# Patient Record
Sex: FEMALE | Race: BLACK OR AFRICAN AMERICAN | NOT HISPANIC OR LATINO | Employment: OTHER | URBAN - METROPOLITAN AREA
[De-identification: names, ages, dates, MRNs, and addresses within clinical notes are randomized per-mention and may not be internally consistent; named-entity substitution may affect disease eponyms.]

---

## 2018-03-26 ENCOUNTER — TELEPHONE (OUTPATIENT)
Dept: OBSTETRICS AND GYNECOLOGY | Facility: CLINIC | Age: 78
End: 2018-03-26

## 2018-08-13 PROBLEM — R10.9 ABDOMINAL PAIN: Status: ACTIVE | Noted: 2018-08-13

## 2018-08-30 DIAGNOSIS — M79.642 LEFT HAND PAIN: Primary | ICD-10-CM

## 2022-08-27 PROBLEM — I10 BENIGN ESSENTIAL HTN: Status: ACTIVE | Noted: 2022-08-27

## 2022-08-27 PROBLEM — N17.9 ACUTE-ON-CHRONIC KIDNEY INJURY: Status: ACTIVE | Noted: 2022-08-27

## 2022-08-27 PROBLEM — N18.9 ACUTE-ON-CHRONIC KIDNEY INJURY: Status: ACTIVE | Noted: 2022-08-27

## 2022-08-27 PROBLEM — E83.52 HYPERCALCEMIA: Status: ACTIVE | Noted: 2022-08-27

## 2022-08-29 PROBLEM — R00.1 SINUS BRADYCARDIA: Status: ACTIVE | Noted: 2022-08-29

## 2022-08-29 PROBLEM — R07.9 CHEST PAIN: Status: ACTIVE | Noted: 2022-08-29

## 2022-08-29 PROBLEM — D64.9 ACUTE ON CHRONIC ANEMIA: Status: ACTIVE | Noted: 2022-08-29

## 2022-08-30 PROBLEM — I35.0 NONRHEUMATIC AORTIC VALVE STENOSIS: Status: ACTIVE | Noted: 2022-08-30

## 2022-08-30 PROBLEM — I51.89 DIASTOLIC DYSFUNCTION: Status: ACTIVE | Noted: 2022-08-30

## 2022-09-02 PROBLEM — R10.9 ABDOMINAL PAIN: Status: RESOLVED | Noted: 2018-08-13 | Resolved: 2022-09-02

## 2022-09-02 PROBLEM — R07.9 CHEST PAIN: Status: RESOLVED | Noted: 2022-08-29 | Resolved: 2022-09-02

## 2022-09-02 PROBLEM — E83.52 HYPERCALCEMIA: Status: RESOLVED | Noted: 2022-08-27 | Resolved: 2022-09-02

## 2022-09-02 PROBLEM — D64.9 ACUTE ON CHRONIC ANEMIA: Status: RESOLVED | Noted: 2022-08-29 | Resolved: 2022-09-02

## 2022-09-02 PROBLEM — D50.9 IDA (IRON DEFICIENCY ANEMIA): Status: ACTIVE | Noted: 2022-09-02

## 2022-09-06 ENCOUNTER — PATIENT OUTREACH (OUTPATIENT)
Dept: ADMINISTRATIVE | Facility: CLINIC | Age: 82
End: 2022-09-06
Payer: MEDICARE

## 2022-09-06 NOTE — PROGRESS NOTES
C3 nurse spoke with Elina Jerez  for a TCC post hospital discharge follow up call. The patient has a scheduled HOSFU appointment with Dr Brown ( kidney/ PCP ) on 09/08/2022 @ 1100.  Called back to offer NP , pt daughter confirmed that Dr Brown; Kidney MD is PCP and appt is scheduled as above.

## 2022-09-24 ENCOUNTER — HOSPITAL ENCOUNTER (INPATIENT)
Facility: HOSPITAL | Age: 82
LOS: 3 days | Discharge: HOME | DRG: 644 | End: 2022-09-27
Attending: EMERGENCY MEDICINE | Admitting: HOSPITALIST
Payer: MEDICARE

## 2022-09-24 DIAGNOSIS — R11.10 VOMITING: ICD-10-CM

## 2022-09-24 DIAGNOSIS — E87.6 HYPOKALEMIA: ICD-10-CM

## 2022-09-24 DIAGNOSIS — R07.9 CHEST PAIN: ICD-10-CM

## 2022-09-24 DIAGNOSIS — E16.2 HYPOGLYCEMIA: ICD-10-CM

## 2022-09-24 DIAGNOSIS — R10.9 ABDOMINAL PAIN, UNSPECIFIED ABDOMINAL LOCATION: Primary | ICD-10-CM

## 2022-09-24 DIAGNOSIS — E83.42 HYPOMAGNESEMIA: ICD-10-CM

## 2022-09-24 DIAGNOSIS — R94.31 QT PROLONGATION: ICD-10-CM

## 2022-09-24 DIAGNOSIS — R53.1 WEAKNESS: ICD-10-CM

## 2022-09-24 DIAGNOSIS — E83.52 HYPERCALCEMIA: ICD-10-CM

## 2022-09-24 PROBLEM — E87.8 ELECTROLYTE ABNORMALITY: Status: ACTIVE | Noted: 2022-09-24

## 2022-09-24 PROBLEM — K59.00 CONSTIPATION: Status: ACTIVE | Noted: 2022-09-24

## 2022-09-24 PROBLEM — R11.2 INTRACTABLE NAUSEA AND VOMITING: Status: ACTIVE | Noted: 2022-09-24

## 2022-09-24 PROBLEM — E87.1 HYPONATREMIA: Status: ACTIVE | Noted: 2022-09-24

## 2022-09-24 LAB
ALBUMIN SERPL BCP-MCNC: 3.7 G/DL (ref 3.5–5.2)
ALP SERPL-CCNC: 52 U/L (ref 55–135)
ALT SERPL W/O P-5'-P-CCNC: 9 U/L (ref 10–44)
ANION GAP SERPL CALC-SCNC: 15 MMOL/L (ref 8–16)
AST SERPL-CCNC: 35 U/L (ref 10–40)
BASOPHILS # BLD AUTO: 0.05 K/UL (ref 0–0.2)
BASOPHILS NFR BLD: 0.9 % (ref 0–1.9)
BILIRUB SERPL-MCNC: 0.7 MG/DL (ref 0.1–1)
BNP SERPL-MCNC: 209 PG/ML (ref 0–99)
BUN SERPL-MCNC: 41 MG/DL (ref 8–23)
CALCIUM SERPL-MCNC: 12.3 MG/DL (ref 8.7–10.5)
CHLORIDE SERPL-SCNC: 100 MMOL/L (ref 95–110)
CK SERPL-CCNC: 168 U/L (ref 20–180)
CO2 SERPL-SCNC: 16 MMOL/L (ref 23–29)
CREAT SERPL-MCNC: 4 MG/DL (ref 0.5–1.4)
DIFFERENTIAL METHOD: ABNORMAL
EOSINOPHIL # BLD AUTO: 0.1 K/UL (ref 0–0.5)
EOSINOPHIL NFR BLD: 1.8 % (ref 0–8)
ERYTHROCYTE [DISTWIDTH] IN BLOOD BY AUTOMATED COUNT: 15.5 % (ref 11.5–14.5)
EST. GFR  (NO RACE VARIABLE): 10.7 ML/MIN/1.73 M^2
GLUCOSE SERPL-MCNC: 63 MG/DL (ref 70–110)
HCT VFR BLD AUTO: 25.9 % (ref 37–48.5)
HGB BLD-MCNC: 8.7 G/DL (ref 12–16)
IMM GRANULOCYTES # BLD AUTO: 0.02 K/UL (ref 0–0.04)
IMM GRANULOCYTES NFR BLD AUTO: 0.4 % (ref 0–0.5)
LIPASE SERPL-CCNC: 58 U/L (ref 4–60)
LYMPHOCYTES # BLD AUTO: 0.8 K/UL (ref 1–4.8)
LYMPHOCYTES NFR BLD: 13.4 % (ref 18–48)
MAGNESIUM SERPL-MCNC: 1.2 MG/DL (ref 1.6–2.6)
MCH RBC QN AUTO: 30.6 PG (ref 27–31)
MCHC RBC AUTO-ENTMCNC: 33.6 G/DL (ref 32–36)
MCV RBC AUTO: 91 FL (ref 82–98)
MONOCYTES # BLD AUTO: 0.5 K/UL (ref 0.3–1)
MONOCYTES NFR BLD: 8.6 % (ref 4–15)
NEUTROPHILS # BLD AUTO: 4.2 K/UL (ref 1.8–7.7)
NEUTROPHILS NFR BLD: 74.9 % (ref 38–73)
NRBC BLD-RTO: 0 /100 WBC
PHOSPHATE SERPL-MCNC: 2.3 MG/DL (ref 2.7–4.5)
PLATELET # BLD AUTO: 176 K/UL (ref 150–450)
PMV BLD AUTO: 10.6 FL (ref 9.2–12.9)
POCT GLUCOSE: 74 MG/DL (ref 70–110)
POCT GLUCOSE: 80 MG/DL (ref 70–110)
POTASSIUM SERPL-SCNC: 3.2 MMOL/L (ref 3.5–5.1)
PROT SERPL-MCNC: 6.4 G/DL (ref 6–8.4)
RBC # BLD AUTO: 2.84 M/UL (ref 4–5.4)
SODIUM SERPL-SCNC: 131 MMOL/L (ref 136–145)
TROPONIN I SERPL DL<=0.01 NG/ML-MCNC: 0.73 NG/ML (ref 0–0.03)
TROPONIN I SERPL DL<=0.01 NG/ML-MCNC: 0.84 NG/ML (ref 0–0.03)
TSH SERPL DL<=0.005 MIU/L-ACNC: 0.41 UIU/ML (ref 0.4–4)
WBC # BLD AUTO: 5.59 K/UL (ref 3.9–12.7)

## 2022-09-24 PROCEDURE — 93005 ELECTROCARDIOGRAM TRACING: CPT

## 2022-09-24 PROCEDURE — 80053 COMPREHEN METABOLIC PANEL: CPT | Performed by: EMERGENCY MEDICINE

## 2022-09-24 PROCEDURE — 93010 EKG 12-LEAD: ICD-10-PCS | Mod: ,,, | Performed by: INTERNAL MEDICINE

## 2022-09-24 PROCEDURE — 82550 ASSAY OF CK (CPK): CPT | Performed by: EMERGENCY MEDICINE

## 2022-09-24 PROCEDURE — 84484 ASSAY OF TROPONIN QUANT: CPT | Performed by: EMERGENCY MEDICINE

## 2022-09-24 PROCEDURE — 85025 COMPLETE CBC W/AUTO DIFF WBC: CPT | Performed by: EMERGENCY MEDICINE

## 2022-09-24 PROCEDURE — 93010 ELECTROCARDIOGRAM REPORT: CPT | Mod: ,,, | Performed by: INTERNAL MEDICINE

## 2022-09-24 PROCEDURE — 99285 EMERGENCY DEPT VISIT HI MDM: CPT | Mod: ,,, | Performed by: EMERGENCY MEDICINE

## 2022-09-24 PROCEDURE — 99285 PR EMERGENCY DEPT VISIT,LEVEL V: ICD-10-PCS | Mod: ,,, | Performed by: EMERGENCY MEDICINE

## 2022-09-24 PROCEDURE — 84443 ASSAY THYROID STIM HORMONE: CPT | Performed by: EMERGENCY MEDICINE

## 2022-09-24 PROCEDURE — 99223 PR INITIAL HOSPITAL CARE,LEVL III: ICD-10-PCS | Mod: GC,,, | Performed by: HOSPITALIST

## 2022-09-24 PROCEDURE — 63600175 PHARM REV CODE 636 W HCPCS

## 2022-09-24 PROCEDURE — 84484 ASSAY OF TROPONIN QUANT: CPT | Mod: 91

## 2022-09-24 PROCEDURE — 25000003 PHARM REV CODE 250: Performed by: EMERGENCY MEDICINE

## 2022-09-24 PROCEDURE — 83735 ASSAY OF MAGNESIUM: CPT | Performed by: EMERGENCY MEDICINE

## 2022-09-24 PROCEDURE — 25000003 PHARM REV CODE 250

## 2022-09-24 PROCEDURE — 83880 ASSAY OF NATRIURETIC PEPTIDE: CPT | Performed by: EMERGENCY MEDICINE

## 2022-09-24 PROCEDURE — 20600001 HC STEP DOWN PRIVATE ROOM

## 2022-09-24 PROCEDURE — 99223 1ST HOSP IP/OBS HIGH 75: CPT | Mod: GC,,, | Performed by: HOSPITALIST

## 2022-09-24 PROCEDURE — 84100 ASSAY OF PHOSPHORUS: CPT

## 2022-09-24 PROCEDURE — 99285 EMERGENCY DEPT VISIT HI MDM: CPT | Mod: 25

## 2022-09-24 PROCEDURE — 83690 ASSAY OF LIPASE: CPT | Performed by: EMERGENCY MEDICINE

## 2022-09-24 RX ORDER — HYDROXYCHLOROQUINE SULFATE 200 MG/1
200 TABLET, FILM COATED ORAL DAILY
Status: DISCONTINUED | OUTPATIENT
Start: 2022-09-25 | End: 2022-09-27 | Stop reason: HOSPADM

## 2022-09-24 RX ORDER — ATORVASTATIN CALCIUM 20 MG/1
20 TABLET, FILM COATED ORAL NIGHTLY
Status: COMPLETED | OUTPATIENT
Start: 2022-09-25 | End: 2022-09-25

## 2022-09-24 RX ORDER — ACETAMINOPHEN 325 MG/1
650 TABLET ORAL EVERY 6 HOURS PRN
Status: DISCONTINUED | OUTPATIENT
Start: 2022-09-25 | End: 2022-09-27 | Stop reason: HOSPADM

## 2022-09-24 RX ORDER — PROCHLORPERAZINE EDISYLATE 5 MG/ML
5 INJECTION INTRAMUSCULAR; INTRAVENOUS EVERY 6 HOURS PRN
Status: DISCONTINUED | OUTPATIENT
Start: 2022-09-24 | End: 2022-09-27 | Stop reason: HOSPADM

## 2022-09-24 RX ORDER — LANOLIN ALCOHOL/MO/W.PET/CERES
1 CREAM (GRAM) TOPICAL
Status: DISCONTINUED | OUTPATIENT
Start: 2022-09-25 | End: 2022-09-27 | Stop reason: HOSPADM

## 2022-09-24 RX ORDER — PROMETHAZINE HYDROCHLORIDE 25 MG/1
25 SUPPOSITORY RECTAL EVERY 6 HOURS PRN
Status: DISCONTINUED | OUTPATIENT
Start: 2022-09-24 | End: 2022-09-27 | Stop reason: HOSPADM

## 2022-09-24 RX ORDER — NALOXONE HCL 0.4 MG/ML
0.02 VIAL (ML) INJECTION
Status: DISCONTINUED | OUTPATIENT
Start: 2022-09-24 | End: 2022-09-27 | Stop reason: HOSPADM

## 2022-09-24 RX ORDER — SODIUM CHLORIDE 0.9 % (FLUSH) 0.9 %
10 SYRINGE (ML) INJECTION EVERY 12 HOURS PRN
Status: DISCONTINUED | OUTPATIENT
Start: 2022-09-24 | End: 2022-09-27 | Stop reason: HOSPADM

## 2022-09-24 RX ORDER — IBUPROFEN 200 MG
16 TABLET ORAL
Status: DISCONTINUED | OUTPATIENT
Start: 2022-09-24 | End: 2022-09-27 | Stop reason: HOSPADM

## 2022-09-24 RX ORDER — ALLOPURINOL 300 MG/1
300 TABLET ORAL DAILY
Status: DISCONTINUED | OUTPATIENT
Start: 2022-09-25 | End: 2022-09-25

## 2022-09-24 RX ORDER — MAGNESIUM SULFATE HEPTAHYDRATE 40 MG/ML
2 INJECTION, SOLUTION INTRAVENOUS
Status: DISCONTINUED | OUTPATIENT
Start: 2022-09-24 | End: 2022-09-24

## 2022-09-24 RX ORDER — POLYETHYLENE GLYCOL 3350 17 G/17G
17 POWDER, FOR SOLUTION ORAL DAILY
Status: DISCONTINUED | OUTPATIENT
Start: 2022-09-25 | End: 2022-09-27 | Stop reason: HOSPADM

## 2022-09-24 RX ORDER — SODIUM CHLORIDE 9 MG/ML
INJECTION, SOLUTION INTRAVENOUS CONTINUOUS
Status: DISCONTINUED | OUTPATIENT
Start: 2022-09-24 | End: 2022-09-25

## 2022-09-24 RX ORDER — ACETAMINOPHEN AND CODEINE PHOSPHATE 300; 30 MG/1; MG/1
1 TABLET ORAL EVERY 8 HOURS PRN
COMMUNITY

## 2022-09-24 RX ORDER — LEVOTHYROXINE SODIUM 50 UG/1
50 TABLET ORAL
Status: DISCONTINUED | OUTPATIENT
Start: 2022-09-25 | End: 2022-09-27 | Stop reason: HOSPADM

## 2022-09-24 RX ORDER — IBUPROFEN 200 MG
24 TABLET ORAL
Status: DISCONTINUED | OUTPATIENT
Start: 2022-09-24 | End: 2022-09-27 | Stop reason: HOSPADM

## 2022-09-24 RX ORDER — POTASSIUM CHLORIDE 7.45 MG/ML
10 INJECTION INTRAVENOUS
Status: COMPLETED | OUTPATIENT
Start: 2022-09-24 | End: 2022-09-25

## 2022-09-24 RX ORDER — POTASSIUM CHLORIDE 20 MEQ/1
40 TABLET, EXTENDED RELEASE ORAL
Status: DISCONTINUED | OUTPATIENT
Start: 2022-09-24 | End: 2022-09-24

## 2022-09-24 RX ORDER — AMOXICILLIN 250 MG
1 CAPSULE ORAL DAILY
Status: DISCONTINUED | OUTPATIENT
Start: 2022-09-25 | End: 2022-09-27 | Stop reason: HOSPADM

## 2022-09-24 RX ORDER — ALISKIREN 150 MG/1
300 TABLET, FILM COATED ORAL DAILY
Status: DISCONTINUED | OUTPATIENT
Start: 2022-09-25 | End: 2022-09-27 | Stop reason: HOSPADM

## 2022-09-24 RX ORDER — ALLOPURINOL 300 MG/1
300 TABLET ORAL DAILY
Status: COMPLETED | OUTPATIENT
Start: 2022-09-25 | End: 2022-09-25

## 2022-09-24 RX ORDER — GLUCAGON 1 MG
1 KIT INJECTION
Status: DISCONTINUED | OUTPATIENT
Start: 2022-09-24 | End: 2022-09-27 | Stop reason: HOSPADM

## 2022-09-24 RX ORDER — MAGNESIUM SULFATE HEPTAHYDRATE 40 MG/ML
2 INJECTION, SOLUTION INTRAVENOUS
Status: COMPLETED | OUTPATIENT
Start: 2022-09-24 | End: 2022-09-25

## 2022-09-24 RX ORDER — HYDROCODONE BITARTRATE AND ACETAMINOPHEN 5; 325 MG/1; MG/1
1 TABLET ORAL EVERY 6 HOURS PRN
Status: DISCONTINUED | OUTPATIENT
Start: 2022-09-25 | End: 2022-09-27 | Stop reason: HOSPADM

## 2022-09-24 RX ORDER — ATORVASTATIN CALCIUM 20 MG/1
20 TABLET, FILM COATED ORAL DAILY
Status: DISCONTINUED | OUTPATIENT
Start: 2022-09-25 | End: 2022-09-25

## 2022-09-24 RX ORDER — FAMOTIDINE 20 MG/1
20 TABLET, FILM COATED ORAL DAILY
Status: DISCONTINUED | OUTPATIENT
Start: 2022-09-25 | End: 2022-09-27 | Stop reason: HOSPADM

## 2022-09-24 RX ADMIN — POTASSIUM CHLORIDE 10 MEQ: 7.46 INJECTION, SOLUTION INTRAVENOUS at 08:09

## 2022-09-24 RX ADMIN — FOLIC ACID 1 MG: 5 INJECTION, SOLUTION INTRAMUSCULAR; INTRAVENOUS; SUBCUTANEOUS at 08:09

## 2022-09-24 RX ADMIN — POTASSIUM CHLORIDE 10 MEQ: 7.46 INJECTION, SOLUTION INTRAVENOUS at 09:09

## 2022-09-24 RX ADMIN — SODIUM CHLORIDE 1000 ML: 0.9 INJECTION, SOLUTION INTRAVENOUS at 07:09

## 2022-09-24 RX ADMIN — SODIUM CHLORIDE: 0.9 INJECTION, SOLUTION INTRAVENOUS at 11:09

## 2022-09-24 RX ADMIN — MAGNESIUM SULFATE 2 G: 2 INJECTION INTRAVENOUS at 10:09

## 2022-09-24 RX ADMIN — POTASSIUM CHLORIDE 10 MEQ: 7.46 INJECTION, SOLUTION INTRAVENOUS at 10:09

## 2022-09-24 RX ADMIN — THIAMINE HYDROCHLORIDE 100 MG: 100 INJECTION, SOLUTION INTRAMUSCULAR; INTRAVENOUS at 08:09

## 2022-09-24 RX ADMIN — PROCHLORPERAZINE EDISYLATE 5 MG: 5 INJECTION INTRAMUSCULAR; INTRAVENOUS at 09:09

## 2022-09-24 NOTE — SUBJECTIVE & OBJECTIVE
Past Medical History:   Diagnosis Date    High cholesterol     Hypertension     Sleep apnea     Thyroid disease        Past Surgical History:   Procedure Laterality Date    ESOPHAGOGASTRODUODENOSCOPY  08/13/2018    ESOPHAGOGASTRODUODENOSCOPY N/A 8/13/2018    Procedure: EGD (ESOPHAGOGASTRODUODENOSCOPY);  Surgeon: Han Garcia MD;  Location: [facility];  Service: Endoscopy;  Laterality: N/A;    HYSTERECTOMY         Review of patient's allergies indicates:   Allergen Reactions    Banana     Pork/porcine containing products Diarrhea    Shellfish containing products Diarrhea       Current Facility-Administered Medications on File Prior to Encounter   Medication    lactated ringers infusion     Current Outpatient Medications on File Prior to Encounter   Medication Sig    aliskiren (TEKTURNA) 300 MG Tab Take by mouth once daily.    allopurinoL (ZYLOPRIM) 300 MG tablet Take 300 mg by mouth once daily.    amLODIPine (NORVASC) 10 MG tablet Take 10 mg by mouth once daily.    atorvastatin (LIPITOR) 20 MG tablet Take 20 mg by mouth once daily.    cloNIDine (CATAPRES) 0.2 MG tablet Take 0.2 mg by mouth 2 (two) times daily.    famotidine (PEPCID) 20 MG tablet Take 1 tablet (20 mg total) by mouth Daily.    ferrous sulfate 325 (65 FE) MG EC tablet Take 325 mg by mouth 3 (three) times daily with meals.    hydroCHLOROthiazide (HYDRODIURIL) 25 MG tablet Take 25 mg by mouth once daily.    HYDROcodone-acetaminophen (NORCO) 5-325 mg per tablet Take 1 tablet by mouth every 6 (six) hours as needed for Pain. (Patient not taking: Reported on 9/6/2022)    hydroxychloroquine (PLAQUENIL) 200 mg tablet Take by mouth once daily.    levothyroxine (SYNTHROID) 50 MCG tablet Take 50 mcg by mouth once daily.    montelukast (SINGULAIR) 10 mg tablet Take 10 mg by mouth every evening.    nebivoloL (BYSTOLIC) 10 MG Tab Take 10 mg by mouth every evening.    ondansetron (ZOFRAN-ODT) 4 MG TbDL Take 1 tablet (4 mg total) by mouth every 6 (six) hours as  needed.    prochlorperazine (COMPAZINE) 10 MG tablet Take 1 tablet (10 mg total) by mouth every 6 (six) hours as needed.     Family History    None       Tobacco Use    Smoking status: Never    Smokeless tobacco: Never   Substance and Sexual Activity    Alcohol use: No    Drug use: No    Sexual activity: Never     Review of Systems   Constitutional:  Positive for fatigue and unexpected weight change (16 lb weight loss on 2-3 weeks). Negative for chills and fever.   HENT:  Positive for rhinorrhea. Negative for congestion, sore throat and trouble swallowing.    Eyes:  Negative for pain and visual disturbance.   Respiratory:  Positive for cough (dry). Negative for shortness of breath.    Cardiovascular:  Negative for chest pain.   Gastrointestinal:  Positive for abdominal pain (intermittent, relieved with bowel movements), constipation, nausea and vomiting. Negative for blood in stool and diarrhea.   Endocrine: Positive for cold intolerance. Negative for polyuria.   Genitourinary:  Negative for difficulty urinating and dysuria.   Musculoskeletal:  Positive for arthralgias (hip pain). Negative for myalgias.   Skin:  Negative for rash and wound.   Allergic/Immunologic: Positive for environmental allergies and food allergies.   Neurological:  Positive for weakness. Negative for dizziness and light-headedness.   Psychiatric/Behavioral:  Negative for confusion and decreased concentration.    Objective:     Vital Signs (Most Recent):  Temp: 97.8 °F (36.6 °C) (09/24/22 1337)  Pulse: 88 (09/24/22 1337)  Resp: 18 (09/24/22 1337)  BP: (!) 110/52 (09/24/22 1337)  SpO2: 96 % (09/24/22 1337)   Vital Signs (24h Range):  Temp:  [97.8 °F (36.6 °C)] 97.8 °F (36.6 °C)  Pulse:  [88] 88  Resp:  [18] 18  SpO2:  [96 %] 96 %  BP: (110)/(52) 110/52     Weight: 58.5 kg (129 lb)  Body mass index is 21.47 kg/m².    Physical Exam  Vitals and nursing note reviewed.   Constitutional:       General: She is not in acute distress.     Appearance:  Normal appearance. She is not ill-appearing or diaphoretic.   HENT:      Right Ear: External ear normal.      Left Ear: External ear normal.      Nose: Nose normal.      Mouth/Throat:      Mouth: Mucous membranes are moist.      Pharynx: Oropharynx is clear. No oropharyngeal exudate.   Eyes:      General: No scleral icterus.        Right eye: No discharge.         Left eye: No discharge.      Extraocular Movements: Extraocular movements intact.      Conjunctiva/sclera: Conjunctivae normal.   Cardiovascular:      Rate and Rhythm: Normal rate and regular rhythm.      Heart sounds: Normal heart sounds. No murmur heard.  Pulmonary:      Effort: Pulmonary effort is normal. No respiratory distress.      Breath sounds: Normal breath sounds. No wheezing or rales.   Abdominal:      General: Abdomen is flat. Bowel sounds are normal. There is no distension.      Palpations: There is no mass.      Tenderness: There is no abdominal tenderness.   Musculoskeletal:         General: No swelling or deformity. Normal range of motion.      Cervical back: Normal range of motion and neck supple.      Right lower leg: No edema.      Left lower leg: No edema.   Skin:     General: Skin is warm.      Capillary Refill: Capillary refill takes 2 to 3 seconds.      Coloration: Skin is not jaundiced.      Findings: No bruising.   Neurological:      Mental Status: She is alert and oriented to person, place, and time.   Psychiatric:         Mood and Affect: Mood normal.         Behavior: Behavior normal.         Thought Content: Thought content normal.           Significant Labs: All pertinent labs within the past 24 hours have been reviewed.    Significant Imaging: I have reviewed all pertinent imaging results/findings within the past 24 hours.

## 2022-09-24 NOTE — ED PROVIDER NOTES
Encounter Date: 9/24/2022       History     Chief Complaint   Patient presents with    Vomiting     N/v off and on for a month; has been admitted for same problem.  Unable to keep anything down     82-YEAR-OLD FEMALE WITH PAST MEDICAL HISTORY of hypertension, thyroid disease presents for evaluation of nausea and vomiting.  Reports symptoms have been intermittent for the last month.  She was recently admitted to the hospital for similar symptoms.  She states that she has not been able to eat and drink normally.  She reports that she is taking nausea medication without relief. She is unsure that any foods make it worse.  She reports several episodes of vomiting today. There is no blood in the vomit but it is yellow in color. She reports decreased urine output.    The history is provided by the patient and a caregiver.   Review of patient's allergies indicates:   Allergen Reactions    Banana     Pork/porcine containing products Diarrhea    Shellfish containing products Diarrhea     Past Medical History:   Diagnosis Date    High cholesterol     Hypertension     Sleep apnea     Thyroid disease      Past Surgical History:   Procedure Laterality Date    ESOPHAGOGASTRODUODENOSCOPY  08/13/2018    ESOPHAGOGASTRODUODENOSCOPY N/A 8/13/2018    Procedure: EGD (ESOPHAGOGASTRODUODENOSCOPY);  Surgeon: Han Garcia MD;  Location: [facility];  Service: Endoscopy;  Laterality: N/A;    HYSTERECTOMY       No family history on file.  Social History     Tobacco Use    Smoking status: Never    Smokeless tobacco: Never   Substance Use Topics    Alcohol use: No    Drug use: No     Review of Systems   Constitutional:  Positive for activity change, appetite change and unexpected weight change. Negative for fever.   HENT:  Negative for sore throat.    Respiratory:  Negative for shortness of breath.    Cardiovascular:  Negative for chest pain.   Gastrointestinal:  Positive for nausea and vomiting.   Genitourinary:  Positive for decreased urine  volume. Negative for dysuria.   Musculoskeletal:  Negative for back pain.   Skin:  Negative for rash.   Neurological:  Negative for weakness.   Hematological:  Does not bruise/bleed easily.   All other systems reviewed and are negative.    Physical Exam     Initial Vitals [09/24/22 1337]   BP Pulse Resp Temp SpO2   (!) 110/52 88 18 97.8 °F (36.6 °C) 96 %      MAP       --         Physical Exam    Nursing note and vitals reviewed.  Constitutional: She appears well-developed and well-nourished.   HENT:   Head: Normocephalic and atraumatic.   Mouth/Throat: Oropharynx is clear and moist.   Eyes: Conjunctivae and EOM are normal. Pupils are equal, round, and reactive to light.   Neck: Phonation normal. Neck supple.   Normal range of motion.  Cardiovascular:  Normal rate, regular rhythm, normal heart sounds and normal pulses.           Pulmonary/Chest: Breath sounds normal. No respiratory distress.   Abdominal: Abdomen is soft. There is no abdominal tenderness.   Musculoskeletal:         General: Normal range of motion.      Cervical back: Normal range of motion and neck supple.     Neurological: She is alert and oriented to person, place, and time.   Skin: Skin is warm and dry.       ED Course   Procedures  Labs Reviewed   CBC W/ AUTO DIFFERENTIAL - Abnormal; Notable for the following components:       Result Value    RBC 2.84 (*)     Hemoglobin 8.7 (*)     Hematocrit 25.9 (*)     RDW 15.5 (*)     Lymph # 0.8 (*)     Gran % 74.9 (*)     Lymph % 13.4 (*)     All other components within normal limits   COMPREHENSIVE METABOLIC PANEL - Abnormal; Notable for the following components:    Sodium 131 (*)     Potassium 3.2 (*)     CO2 16 (*)     Glucose 63 (*)     BUN 41 (*)     Creatinine 4.0 (*)     Calcium 12.3 (*)     Alkaline Phosphatase 52 (*)     ALT 9 (*)     eGFR 10.7 (*)     All other components within normal limits   MAGNESIUM - Abnormal; Notable for the following components:    Magnesium 1.2 (*)     All other  components within normal limits   B-TYPE NATRIURETIC PEPTIDE - Abnormal; Notable for the following components:     (*)     All other components within normal limits   TROPONIN I - Abnormal; Notable for the following components:    Troponin I 0.727 (*)     All other components within normal limits   LIPASE   TSH   CK   URINALYSIS, REFLEX TO URINE CULTURE   PHOSPHORUS     EKG Readings: (Independently Interpreted)   Initial Reading: No STEMI. Rhythm: Normal Sinus Rhythm. Heart Rate: 91. Ectopy: No Ectopy. Conduction: Normal.     Imaging Results    None          Medications   sodium chloride 0.9% bolus 1,000 mL (has no administration in time range)   sodium chloride 0.9% flush 10 mL (has no administration in time range)   naloxone 0.4 mg/mL injection 0.02 mg (has no administration in time range)   glucose chewable tablet 16 g (has no administration in time range)   glucose chewable tablet 24 g (has no administration in time range)   glucagon (human recombinant) injection 1 mg (has no administration in time range)   0.9%  NaCl infusion (has no administration in time range)   prochlorperazine injection Soln 5 mg (has no administration in time range)   promethazine suppository 25 mg (has no administration in time range)   polyethylene glycol packet 17 g (has no administration in time range)   senna-docusate 8.6-50 mg per tablet 1 tablet (has no administration in time range)   potassium chloride 10 mEq in 100 mL IVPB (has no administration in time range)   magnesium sulfate 2g in water 50mL IVPB (premix) (has no administration in time range)   thiamine (B-1) 100 mg in dextrose 5 % 100 mL IVPB (has no administration in time range)   folic acid 1 mg in dextrose 5 % 100 mL IVPB (has no administration in time range)   aliskiren tablet 300 mg (has no administration in time range)   allopurinoL tablet 300 mg (has no administration in time range)   atorvastatin tablet 20 mg (has no administration in time range)   famotidine  tablet 20 mg (has no administration in time range)   ferrous sulfate tablet 1 each (has no administration in time range)   hydrOXYchloroQUINE tablet 200 mg (has no administration in time range)   levothyroxine tablet 50 mcg (has no administration in time range)     Medical Decision Making:   History:   Old Medical Records: I decided to obtain old medical records.  Initial Assessment:   Evaluation of vomiting  Differential Diagnosis:   Electrolyte derangement, dehydration, thyroid dysfunction, doubt acute intra-abdominal process or bowel obstruction  Independently Interpreted Test(s):   I have ordered and independently interpreted EKG Reading(s) - see prior notes  Clinical Tests:   Lab Tests: Ordered and Reviewed  Medical Tests: Ordered and Reviewed  ED Management:  I reviewed recent workup and hospitalization.  Lab work ordered today, electrolyte abnormalities including hypercalcemia noted.  Patient associated with IV fluids.  Given IV magnesium.  There are no acute associated cardiac dysrhythmias.  Patient will require readmission to the hospital for management of electrolyte derangement             ED Course as of 09/24/22 1938   Sat Sep 24, 2022   1650 Potassium(!): 3.2 [HS]   1651 Magnesium(!): 1.2 [HS]   1651 Calcium(!!): 12.3 [HS]   1651 Severe electrolyte derangement noted.  Calcium more elevated than prior.  Low magnesium and potassium levels.  Creatinine at baseline. [HS]   1651 CO2(!): 16  Acidosis worsened. [HS]      ED Course User Index  [HS] Mayco Carolina MD                 Clinical Impression:   Final diagnoses:  [R11.10] Vomiting  [R10.9] Abdominal pain, unspecified abdominal location (Primary)  [E83.52] Hypercalcemia  [E16.2] Hypoglycemia  [E87.6] Hypokalemia  [E83.42] Hypomagnesemia        ED Disposition Condition    Admit Stable                Mayco Carolina MD  09/24/22 1938

## 2022-09-24 NOTE — HPI
82 year old female with HLD, HTN, OLGA, and Thyroid disease presented to ED with intractable nausea and vomiting. She reports a 4 week history of nausea and vomiting unrelieved by anti-emetics. She has had PO intolerance due to the N/V but does not report any association of symptoms with the type of food or beverage she consumes. Her vomit has always been non-bloody and today her vomit was bilious. She endorses difficulty having bowel movements and constipation as well as decreased urine output secondary to her decreased PO intake. She claims to have an appetite that waxes and wanes but does not have any food aversion and notes that salad tends to be a food she can keep down. She endorses lethargy, weakness, and a 16lb weight loss in past 2-3 weeks. She denies any fever, chills, diarrhea, difficulty swallowing, chest pain, SOB, blood in her stool or vomit.     Seen 9/13 for similar complaints, CT at the time not concerning for obstruction and she was sent home with zofran and compazine. Prior to that, she was in the ICU for electrolyte derangements related to similar episode.    In the ED, she was mildly hypotensive on arrival to 90's/40's with a CMP remarkable for several electrolyte abnormalities: hypercalcemia to 12.3, hypokalemia to 3.2, hyponatremia to 131, metabolic acidosis with bicarb of 16, and hypomagnesemia of 1.2. CBC was remarkable for Hb of 8.7 and elevated troponin of 0.54 and 0.72. Her BNP was elevated to 209.    She was admitted to hospital medicine for further evaluation and management of intractable N/V with electrolyte derangements.

## 2022-09-25 PROBLEM — E03.9 ACQUIRED HYPOTHYROIDISM: Status: ACTIVE | Noted: 2022-09-25

## 2022-09-25 PROBLEM — E55.9 VITAMIN D DEFICIENCY: Status: ACTIVE | Noted: 2022-09-25

## 2022-09-25 LAB
ALBUMIN SERPL BCP-MCNC: 3.3 G/DL (ref 3.5–5.2)
ALP SERPL-CCNC: 50 U/L (ref 55–135)
ALT SERPL W/O P-5'-P-CCNC: 8 U/L (ref 10–44)
ANION GAP SERPL CALC-SCNC: 10 MMOL/L (ref 8–16)
AST SERPL-CCNC: 37 U/L (ref 10–40)
BASOPHILS # BLD AUTO: 0.04 K/UL (ref 0–0.2)
BASOPHILS NFR BLD: 0.8 % (ref 0–1.9)
BILIRUB SERPL-MCNC: 0.6 MG/DL (ref 0.1–1)
BUN SERPL-MCNC: 36 MG/DL (ref 8–23)
CALCIUM SERPL-MCNC: 11.8 MG/DL (ref 8.7–10.5)
CHLORIDE SERPL-SCNC: 102 MMOL/L (ref 95–110)
CO2 SERPL-SCNC: 17 MMOL/L (ref 23–29)
CREAT SERPL-MCNC: 3.4 MG/DL (ref 0.5–1.4)
DIFFERENTIAL METHOD: ABNORMAL
EOSINOPHIL # BLD AUTO: 0.3 K/UL (ref 0–0.5)
EOSINOPHIL NFR BLD: 4.8 % (ref 0–8)
ERYTHROCYTE [DISTWIDTH] IN BLOOD BY AUTOMATED COUNT: 15.3 % (ref 11.5–14.5)
EST. GFR  (NO RACE VARIABLE): 13 ML/MIN/1.73 M^2
GLUCOSE SERPL-MCNC: 72 MG/DL (ref 70–110)
HCT VFR BLD AUTO: 24.9 % (ref 37–48.5)
HGB BLD-MCNC: 8.2 G/DL (ref 12–16)
IMM GRANULOCYTES # BLD AUTO: 0.01 K/UL (ref 0–0.04)
IMM GRANULOCYTES NFR BLD AUTO: 0.2 % (ref 0–0.5)
LYMPHOCYTES # BLD AUTO: 1.1 K/UL (ref 1–4.8)
LYMPHOCYTES NFR BLD: 20.5 % (ref 18–48)
MAGNESIUM SERPL-MCNC: 2.3 MG/DL (ref 1.6–2.6)
MCH RBC QN AUTO: 30.4 PG (ref 27–31)
MCHC RBC AUTO-ENTMCNC: 32.9 G/DL (ref 32–36)
MCV RBC AUTO: 92 FL (ref 82–98)
MONOCYTES # BLD AUTO: 0.6 K/UL (ref 0.3–1)
MONOCYTES NFR BLD: 10.5 % (ref 4–15)
NEUTROPHILS # BLD AUTO: 3.3 K/UL (ref 1.8–7.7)
NEUTROPHILS NFR BLD: 63.2 % (ref 38–73)
NRBC BLD-RTO: 0 /100 WBC
PHOSPHATE SERPL-MCNC: 1.7 MG/DL (ref 2.7–4.5)
PLATELET # BLD AUTO: 158 K/UL (ref 150–450)
PMV BLD AUTO: 10.4 FL (ref 9.2–12.9)
POTASSIUM SERPL-SCNC: 3.9 MMOL/L (ref 3.5–5.1)
PROT SERPL-MCNC: 5.8 G/DL (ref 6–8.4)
PTH-INTACT SERPL-MCNC: 309.3 PG/ML (ref 9–77)
RBC # BLD AUTO: 2.7 M/UL (ref 4–5.4)
SODIUM SERPL-SCNC: 129 MMOL/L (ref 136–145)
TROPONIN I SERPL DL<=0.01 NG/ML-MCNC: 0.93 NG/ML (ref 0–0.03)
WBC # BLD AUTO: 5.23 K/UL (ref 3.9–12.7)

## 2022-09-25 PROCEDURE — 99233 PR SUBSEQUENT HOSPITAL CARE,LEVL III: ICD-10-PCS | Mod: GC,,, | Performed by: HOSPITALIST

## 2022-09-25 PROCEDURE — 63600175 PHARM REV CODE 636 W HCPCS

## 2022-09-25 PROCEDURE — 99222 1ST HOSP IP/OBS MODERATE 55: CPT | Mod: GC,,, | Performed by: INTERNAL MEDICINE

## 2022-09-25 PROCEDURE — 80053 COMPREHEN METABOLIC PANEL: CPT

## 2022-09-25 PROCEDURE — 25000003 PHARM REV CODE 250

## 2022-09-25 PROCEDURE — 36415 COLL VENOUS BLD VENIPUNCTURE: CPT

## 2022-09-25 PROCEDURE — 99222 PR INITIAL HOSPITAL CARE,LEVL II: ICD-10-PCS | Mod: GC,,, | Performed by: INTERNAL MEDICINE

## 2022-09-25 PROCEDURE — 84484 ASSAY OF TROPONIN QUANT: CPT

## 2022-09-25 PROCEDURE — 99233 SBSQ HOSP IP/OBS HIGH 50: CPT | Mod: GC,,, | Performed by: HOSPITALIST

## 2022-09-25 PROCEDURE — 85025 COMPLETE CBC W/AUTO DIFF WBC: CPT

## 2022-09-25 PROCEDURE — 93010 ELECTROCARDIOGRAM REPORT: CPT | Mod: ,,, | Performed by: STUDENT IN AN ORGANIZED HEALTH CARE EDUCATION/TRAINING PROGRAM

## 2022-09-25 PROCEDURE — 83970 ASSAY OF PARATHORMONE: CPT

## 2022-09-25 PROCEDURE — 84100 ASSAY OF PHOSPHORUS: CPT

## 2022-09-25 PROCEDURE — 93005 ELECTROCARDIOGRAM TRACING: CPT

## 2022-09-25 PROCEDURE — 93010 EKG 12-LEAD: ICD-10-PCS | Mod: ,,, | Performed by: STUDENT IN AN ORGANIZED HEALTH CARE EDUCATION/TRAINING PROGRAM

## 2022-09-25 PROCEDURE — 20600001 HC STEP DOWN PRIVATE ROOM

## 2022-09-25 PROCEDURE — 83735 ASSAY OF MAGNESIUM: CPT

## 2022-09-25 RX ORDER — ALLOPURINOL 300 MG/1
300 TABLET ORAL
Status: DISCONTINUED | OUTPATIENT
Start: 2022-09-25 | End: 2022-09-27 | Stop reason: HOSPADM

## 2022-09-25 RX ORDER — ATORVASTATIN CALCIUM 20 MG/1
20 TABLET, FILM COATED ORAL NIGHTLY
Status: DISCONTINUED | OUTPATIENT
Start: 2022-09-25 | End: 2022-09-27 | Stop reason: HOSPADM

## 2022-09-25 RX ORDER — SODIUM CHLORIDE 9 MG/ML
INJECTION, SOLUTION INTRAVENOUS CONTINUOUS
Status: DISCONTINUED | OUTPATIENT
Start: 2022-09-25 | End: 2022-09-27 | Stop reason: HOSPADM

## 2022-09-25 RX ORDER — CINACALCET 30 MG/1
30 TABLET, FILM COATED ORAL 2 TIMES DAILY
Status: DISCONTINUED | OUTPATIENT
Start: 2022-09-25 | End: 2022-09-27 | Stop reason: HOSPADM

## 2022-09-25 RX ORDER — CINACALCET 30 MG/1
30 TABLET, FILM COATED ORAL DAILY
Status: DISCONTINUED | OUTPATIENT
Start: 2022-09-25 | End: 2022-09-25

## 2022-09-25 RX ORDER — CHOLECALCIFEROL (VITAMIN D3) 25 MCG
1000 TABLET ORAL DAILY
Status: DISCONTINUED | OUTPATIENT
Start: 2022-09-25 | End: 2022-09-27

## 2022-09-25 RX ORDER — CINACALCET 30 MG/1
30 TABLET, FILM COATED ORAL DAILY
Status: ON HOLD | COMMUNITY
Start: 2022-09-20 | End: 2022-09-27 | Stop reason: HOSPADM

## 2022-09-25 RX ADMIN — POLYETHYLENE GLYCOL 3350 17 G: 17 POWDER, FOR SOLUTION ORAL at 09:09

## 2022-09-25 RX ADMIN — CINACALCET 30 MG: 30 TABLET, FILM COATED ORAL at 11:09

## 2022-09-25 RX ADMIN — ATORVASTATIN CALCIUM 20 MG: 20 TABLET, FILM COATED ORAL at 01:09

## 2022-09-25 RX ADMIN — SODIUM CHLORIDE: 9 INJECTION, SOLUTION INTRAVENOUS at 11:09

## 2022-09-25 RX ADMIN — SENNOSIDES AND DOCUSATE SODIUM 1 TABLET: 50; 8.6 TABLET ORAL at 09:09

## 2022-09-25 RX ADMIN — POTASSIUM CHLORIDE 10 MEQ: 7.46 INJECTION, SOLUTION INTRAVENOUS at 02:09

## 2022-09-25 RX ADMIN — FAMOTIDINE 20 MG: 20 TABLET ORAL at 04:09

## 2022-09-25 RX ADMIN — ATORVASTATIN CALCIUM 20 MG: 20 TABLET, FILM COATED ORAL at 08:09

## 2022-09-25 RX ADMIN — CINACALCET 30 MG: 30 TABLET, FILM COATED ORAL at 08:09

## 2022-09-25 RX ADMIN — ALLOPURINOL 300 MG: 300 TABLET ORAL at 11:09

## 2022-09-25 RX ADMIN — FERROUS SULFATE TAB 325 MG (65 MG ELEMENTAL FE) 1 EACH: 325 (65 FE) TAB at 08:09

## 2022-09-25 RX ADMIN — ALLOPURINOL 300 MG: 300 TABLET ORAL at 01:09

## 2022-09-25 RX ADMIN — ALISKIREN HEMIFUMARATE 300 MG: 150 TABLET, FILM COATED ORAL at 02:09

## 2022-09-25 RX ADMIN — PROCHLORPERAZINE EDISYLATE 5 MG: 5 INJECTION INTRAMUSCULAR; INTRAVENOUS at 08:09

## 2022-09-25 RX ADMIN — SODIUM PHOSPHATE, MONOBASIC, MONOHYDRATE 20.01 MMOL: 276; 142 INJECTION, SOLUTION INTRAVENOUS at 09:09

## 2022-09-25 RX ADMIN — MAGNESIUM SULFATE 2 G: 2 INJECTION INTRAVENOUS at 01:09

## 2022-09-25 RX ADMIN — FERROUS SULFATE TAB 325 MG (65 MG ELEMENTAL FE) 1 EACH: 325 (65 FE) TAB at 11:09

## 2022-09-25 RX ADMIN — CHOLECALCIFEROL TAB 25 MCG (1000 UNIT) 1000 UNITS: 25 TAB at 02:09

## 2022-09-25 RX ADMIN — POTASSIUM CHLORIDE 10 MEQ: 7.46 INJECTION, SOLUTION INTRAVENOUS at 12:09

## 2022-09-25 RX ADMIN — LEVOTHYROXINE SODIUM 50 MCG: 50 TABLET ORAL at 05:09

## 2022-09-25 RX ADMIN — HYDROCODONE BITARTRATE AND ACETAMINOPHEN 1 TABLET: 5; 325 TABLET ORAL at 01:09

## 2022-09-25 RX ADMIN — HYDROXYCHLOROQUINE SULFATE 200 MG: 200 TABLET, FILM COATED ORAL at 09:09

## 2022-09-25 RX ADMIN — FERROUS SULFATE TAB 325 MG (65 MG ELEMENTAL FE) 1 EACH: 325 (65 FE) TAB at 04:09

## 2022-09-25 NOTE — PROGRESS NOTES
[facility] - Telemetry [facility] [place] Medicine  Progress Note    Patient Name: Elina Jerez  MRN: [id number]  Patient Class: IP- Inpatient   Admission Date: 9/24/2022  Length of Stay: 1 days  Attending Physician: Han Frazier MD  Primary Care Provider: Carlos Tony MD        Subjective:     Principal Problem:Electrolyte abnormality        HPI:  82 year old female with HLD, HTN, OLGA, and Thyroid disease presented to ED with intractable nausea and vomiting. She reports a 4 week history of nausea and vomiting unrelieved by anti-emetics. She has had PO intolerance due to the N/V but does not report any association of symptoms with the type of food or beverage she consumes. Her vomit has always been non-bloody and today her vomit was bilious. She endorses difficulty having bowel movements and constipation as well as decreased urine output secondary to her decreased PO intake. She claims to have an appetite that waxes and wanes but does not have any food aversion and notes that salad tends to be a food she can keep down. She endorses lethargy, weakness, and a 16lb weight loss in past 2-3 weeks. She denies any fever, chills, diarrhea, difficulty swallowing, chest pain, SOB, blood in her stool or vomit.     Seen 9/13 for similar complaints, CT at the time not concerning for obstruction and she was sent home with zofran and compazine. Prior to that, she was in the ICU for electrolyte derangements related to similar episode.    In the ED, she was mildly hypotensive on arrival to 90's/40's with a CMP remarkable for several electrolyte abnormalities: hypercalcemia to 12.3, hypokalemia to 3.2, hyponatremia to 131, metabolic acidosis with bicarb of 16, and hypomagnesemia of 1.2. CBC was remarkable for Hb of 8.7 and elevated troponin of 0.54 and 0.72. Her BNP was elevated to 209.    She was admitted to hospital medicine for further evaluation and management of intractable N/V with electrolyte  derangements.      Overview/Hospital Course:  Patient admitted for intractable N/V, PO intolerance, and electrolyte derangements. She was placed on IVF, PRN antiemetics, electrolytes were replaced, and vitamin supplementation was provided. On 9/25, patient much improved without N/V and electrolytes trending in the right direction. Endocrinology was consulted regarding patient's hypercalcemia likely 2/2 primary hyperparathyroidism contributing to the N/V, increasing cinacalcet to 30mg BID and discontinuing HCTZ entirely.       Interval History: NAEON. Patient reports feeling well today and much improved from yesterday. She denies any nausea or recent vomiting.    Review of Systems   Constitutional:  Positive for fatigue and unexpected weight change. Negative for chills and fever.   HENT:  Positive for rhinorrhea. Negative for congestion, sore throat and trouble swallowing.    Eyes:  Negative for pain and visual disturbance.   Respiratory:  Negative for cough and shortness of breath.    Cardiovascular:  Negative for chest pain.   Gastrointestinal:  Positive for constipation. Negative for abdominal pain, diarrhea, nausea and vomiting.   Endocrine: Positive for cold intolerance. Negative for polyuria.   Genitourinary:  Negative for difficulty urinating and dysuria.   Musculoskeletal:  Positive for arthralgias (chronic hip pain). Negative for myalgias.   Skin:  Negative for rash and wound.   Neurological:  Negative for dizziness, weakness and light-headedness.   Psychiatric/Behavioral:  Negative for confusion and decreased concentration.    Objective:     Vital Signs (Most Recent):  Temp: 97.8 °F (36.6 °C) (09/25/22 0802)  Pulse: 75 (09/25/22 0802)  Resp: 18 (09/25/22 0802)  BP: (!) 111/54 (09/25/22 0802)  SpO2: 95 % (09/25/22 0802)   Vital Signs (24h Range):  Temp:  [97.8 °F (36.6 °C)-98.1 °F (36.7 °C)] 97.8 °F (36.6 °C)  Pulse:  [75-92] 75  Resp:  [16-19] 18  SpO2:  [90 %-100 %] 95 %  BP: (105-126)/(52-60) 111/54      Weight: 58.5 kg (128 lb 15.5 oz)  Body mass index is 22.85 kg/m².    Intake/Output Summary (Last 24 hours) at 9/25/2022 1002  Last data filed at 9/25/2022 0600  Gross per 24 hour   Intake 740 ml   Output 300 ml   Net 440 ml      Physical Exam  Vitals and nursing note reviewed.   Constitutional:       General: She is not in acute distress.     Appearance: Normal appearance. She is not ill-appearing or diaphoretic.   HENT:      Right Ear: External ear normal.      Left Ear: External ear normal.      Nose: Nose normal.   Eyes:      General:         Right eye: No discharge.         Left eye: No discharge.      Extraocular Movements: Extraocular movements intact.      Conjunctiva/sclera: Conjunctivae normal.   Cardiovascular:      Rate and Rhythm: Normal rate and regular rhythm.      Heart sounds: Normal heart sounds. No murmur heard.  Pulmonary:      Effort: Pulmonary effort is normal. No respiratory distress.      Breath sounds: Normal breath sounds. No wheezing or rales.   Abdominal:      General: Abdomen is flat. There is no distension.      Palpations: There is no mass.      Tenderness: There is no abdominal tenderness.   Musculoskeletal:         General: No swelling or deformity. Normal range of motion.      Cervical back: Normal range of motion and neck supple.      Right lower leg: No edema.      Left lower leg: No edema.   Skin:     General: Skin is warm.      Capillary Refill: Capillary refill takes 2 to 3 seconds.      Coloration: Skin is not jaundiced.      Findings: No bruising.   Neurological:      Mental Status: She is alert and oriented to person, place, and time.   Psychiatric:         Mood and Affect: Mood normal.         Behavior: Behavior normal.         Thought Content: Thought content normal.     Significant Labs: All pertinent labs within the past 24 hours have been reviewed.    Significant Imaging: I have reviewed all pertinent imaging results/findings within the past 24  hours.      Assessment/Plan:      * Electrolyte abnormality  82 year old female with HLD, HTN, OLGA, and Thyroid disease admitted for evaluation and management of month-long intractable N/V with electrolyte derangements. Patient with decreased PO tolerance, non-bloody bilious vomit, decreased urine output, lethargy, weakness, and 16lb weight loss in past 2-3 weeks. CMP remarkable for several electrolyte abnormalities: hypercalcemia, hypokalemia, hyponatremia, metabolic acidosis, and hypomagnesemia. CBC was remarkable for Hb of 8.7 and elevated troponin of 0.54 and 0.72. BNP was elevated to 209.     Hypercalcemia, likely 2/2 primary hyperparathyroidism, causing N/V and decreased GI motility leading to patients clinical presentation with PO intolerance and secondary electrolyte derangements.     - PTH level inappropriately elevated  - unable to order Vit D level inpatient    Plan:  - electrolytes repleted  - endocrinology consulted for hypercalcemia, f/u recs  - cincacalcet increased to 30 mg BID  - hold HCTZ in the hospital and at discharge   - Replete vitamin-D, recommend Vit D3 4974-7346 IU once daily.  Recheck outpatient in 1-2 months.  - Continue with IV fluids for now as tolerated and encourage oral hydration  - Should have outpatient workup with 24 hour urine calcium collection to assess for FHH (familial hypocalciuric hypercalcemia), not needed inpatient  - Can be followed outpatient further for this in endocrine clinic as well  - daily CMP with Mag and Phos, replete electrolytes as needed      Intractable nausea and vomiting  Intractable N/V with decreased PO tolerance, N/V controlled on current PRN regiment    - first line anti-emetic IV compazine   - second line anti-emetic phenergan suppository  - Maintenance fluids with continuous NS at 100 ml/hr  - clear liquid diet advance as tolerated  - daily EKG's to monitor QTc      KHOI (iron deficiency anemia)  Hb on arrival 8.7, will trend    - continue home iron  supplement  - daily CBC, trend Hb  - if Hb < 7, transfuse RBC      Constipation  Likely combination of hypercalcemia and decreased PO intake    - daily miralax and doc-senna      Benign essential HTN  Home medications: nebivolol 10mg nightly, HCTZ 25mg daily, amlodipine 10mg daily    - hold antihypertensives in the setting of volume depletion and initially soft blood pressures  - will restart as clinically indicated      Hypomagnesemia  - see electrolyte abnormality      Hyponatremia  - see electrolyte abnormality    Hypokalemia  - see electrolyte abnormality      Hypercalcemia  - see electrolyte abnormality      VTE Risk Mitigation (From admission, onward)           Ordered     IP VTE HIGH RISK PATIENT  Once         09/24/22 1833     Place sequential compression device  Until discontinued         09/24/22 1833                    Discharge Planning   VERNA:      Code Status: Full Code   Is the patient medically ready for discharge?:     Reason for patient still in hospital (select all that apply): Patient trending condition, Laboratory test, Treatment and Consult recommendations                     Manisha Smith MD  Department of Hospital Medicine   [facility] - Telemetry Stepdown

## 2022-09-25 NOTE — ASSESSMENT & PLAN NOTE
Hb on arrival 8.7, will trend    - continue home iron supplement  - daily CBC, trend Hb  - if Hb < 7, transfuse RBC

## 2022-09-25 NOTE — SUBJECTIVE & OBJECTIVE
Interval History: NAEON. Patient reports feeling well today and much improved from yesterday. She denies any nausea or recent vomiting.    Review of Systems   Constitutional:  Positive for fatigue and unexpected weight change. Negative for chills and fever.   HENT:  Positive for rhinorrhea. Negative for congestion, sore throat and trouble swallowing.    Eyes:  Negative for pain and visual disturbance.   Respiratory:  Negative for cough and shortness of breath.    Cardiovascular:  Negative for chest pain.   Gastrointestinal:  Positive for constipation. Negative for abdominal pain, diarrhea, nausea and vomiting.   Endocrine: Positive for cold intolerance. Negative for polyuria.   Genitourinary:  Negative for difficulty urinating and dysuria.   Musculoskeletal:  Positive for arthralgias (chronic hip pain). Negative for myalgias.   Skin:  Negative for rash and wound.   Neurological:  Negative for dizziness, weakness and light-headedness.   Psychiatric/Behavioral:  Negative for confusion and decreased concentration.    Objective:     Vital Signs (Most Recent):  Temp: 97.8 °F (36.6 °C) (09/25/22 0802)  Pulse: 75 (09/25/22 0802)  Resp: 18 (09/25/22 0802)  BP: (!) 111/54 (09/25/22 0802)  SpO2: 95 % (09/25/22 0802)   Vital Signs (24h Range):  Temp:  [97.8 °F (36.6 °C)-98.1 °F (36.7 °C)] 97.8 °F (36.6 °C)  Pulse:  [75-92] 75  Resp:  [16-19] 18  SpO2:  [90 %-100 %] 95 %  BP: (105-126)/(52-60) 111/54     Weight: 58.5 kg (128 lb 15.5 oz)  Body mass index is 22.85 kg/m².    Intake/Output Summary (Last 24 hours) at 9/25/2022 1002  Last data filed at 9/25/2022 0600  Gross per 24 hour   Intake 740 ml   Output 300 ml   Net 440 ml      Physical Exam  Vitals and nursing note reviewed.   Constitutional:       General: She is not in acute distress.     Appearance: Normal appearance. She is not ill-appearing or diaphoretic.   HENT:      Right Ear: External ear normal.      Left Ear: External ear normal.      Nose: Nose normal.   Eyes:       General:         Right eye: No discharge.         Left eye: No discharge.      Extraocular Movements: Extraocular movements intact.      Conjunctiva/sclera: Conjunctivae normal.   Cardiovascular:      Rate and Rhythm: Normal rate and regular rhythm.      Heart sounds: Normal heart sounds. No murmur heard.  Pulmonary:      Effort: Pulmonary effort is normal. No respiratory distress.      Breath sounds: Normal breath sounds. No wheezing or rales.   Abdominal:      General: Abdomen is flat. There is no distension.      Palpations: There is no mass.      Tenderness: There is no abdominal tenderness.   Musculoskeletal:         General: No swelling or deformity. Normal range of motion.      Cervical back: Normal range of motion and neck supple.      Right lower leg: No edema.      Left lower leg: No edema.   Skin:     General: Skin is warm.      Capillary Refill: Capillary refill takes 2 to 3 seconds.      Coloration: Skin is not jaundiced.      Findings: No bruising.   Neurological:      Mental Status: She is alert and oriented to person, place, and time.   Psychiatric:         Mood and Affect: Mood normal.         Behavior: Behavior normal.         Thought Content: Thought content normal.     Significant Labs: All pertinent labs within the past 24 hours have been reviewed.    Significant Imaging: I have reviewed all pertinent imaging results/findings within the past 24 hours.

## 2022-09-25 NOTE — ASSESSMENT & PLAN NOTE
Home medications: nebivolol 10mg nightly, HCTZ 25mg daily, amlodipine 10mg daily    - hold antihypertensives in the setting of volume depletion and initially soft blood pressures  - will restart as clinically indicated

## 2022-09-25 NOTE — SUBJECTIVE & OBJECTIVE
Interval HPI:   Overnight events: Admitted and placed on IV fluids.  Nausea slowly improving.    Eating:    Clear liquid diet  Nausea: Yes  Hypoglycemia and intervention: No  Fever: No  TPN and/or TF: No  If yes, type of TF/TPN and rate: N/A    PMH, PSH, FH, SH updated and reviewed     ROS:  As above    Labs Reviewed and Include:  BASELINE Creatinine:   [unfilled]  [unfilled]  [unfilled]  Recent Labs   Lab 09/25/22  0239   GLU 72   CALCIUM 11.8*   ALBUMIN 3.3*   PROT 5.8*   *   K 3.9   CO2 17*      BUN 36*   CREATININE 3.4*   ALKPHOS 50*   ALT 8*   AST 37   BILITOT 0.6     Lab Results   Component Value Date    HGBA1C 4.9 03/30/2021       Nutritional status:   Body mass index is 22.85 kg/m².  Lab Results   Component Value Date    ALBUMIN 3.3 (L) 09/25/2022    ALBUMIN 3.7 09/24/2022    ALBUMIN 4.1 09/13/2022     No results found for: PREALBUMIN    Estimated Creatinine Clearance: 10.7 mL/min (A) (based on SCr of 3.4 mg/dL (H)).      PHYSICAL EXAMINATION:  Vitals:    09/25/22 1144   BP: (!) 108/54   Pulse: 72   Resp: 18   Temp: 98 °F (36.7 °C)     Body mass index is 22.85 kg/m².    Physical Exam  Constitutional:       Appearance: Normal appearance.   HENT:      Head: Normocephalic and atraumatic.   Eyes:      Extraocular Movements: Extraocular movements intact.   Cardiovascular:      Rate and Rhythm: Normal rate.      Pulses: Normal pulses.   Pulmonary:      Effort: Pulmonary effort is normal.   Abdominal:      Palpations: Abdomen is soft.   Musculoskeletal:      Cervical back: Normal range of motion and neck supple.   Neurological:      General: No focal deficit present.      Mental Status: She is alert.   Psychiatric:         Mood and Affect: Mood normal.         Behavior: Behavior normal.

## 2022-09-25 NOTE — ASSESSMENT & PLAN NOTE
Intractable N/V with decreased PO tolerance, N/V controlled on current PRN regiment    - first line anti-emetic IV compazine   - second line anti-emetic phenergan suppository  - Maintenance fluids with continuous NS at 100 ml/hr  - clear liquid diet advance as tolerated  - daily EKG's to monitor QTc

## 2022-09-25 NOTE — PHARMACY MED REC
"Admission Medication History     The home medication history was taken by Francy Al.    You may go to "Admission" then "Reconcile Home Medications" tabs to review and/or act upon these items.     The home medication list has been updated by the Pharmacy department.   Please read ALL comments highlighted in yellow.   Please address this information as you see fit.    Feel free to contact us if you have any questions or require assistance.      The medications listed below were removed from the home medication list. Please reorder if appropriate:  Patient reports no longer taking the following medication(s):  CLONIDINE 0.2MG  FERROUS SULFATE 325MG  HYDROCODONE-ACETAMINOPHEN 5-325MG  MONTELUKAST 10MG     Medications listed below were obtained from: Patient/family and Medications brought from home  PTA Medications   Medication Sig    acetaminophen-codeine 300-30mg (TYLENOL #3) 300-30 mg Tab   Take 1 tablet by mouth every 8 (eight) hours as needed (pain).    aliskiren (TEKTURNA) 300 MG Tab   Take by mouth once daily.    allopurinoL (ZYLOPRIM) 300 MG tablet   Take 300 mg by mouth once daily.    amLODIPine (NORVASC) 10 MG tablet   Take 10 mg by mouth once daily.    atorvastatin (LIPITOR) 20 MG tablet   Take 20 mg by mouth once daily.    cinacalcet (SENSIPAR) 30 MG Tab   Take 30 mg by mouth once daily.    famotidine (PEPCID) 20 MG tablet   Take 1 tablet (20 mg total) by mouth Daily.    hydroCHLOROthiazide (HYDRODIURIL) 25 MG tablet   Take 25 mg by mouth once daily.    hydroxychloroquine (PLAQUENIL) 200 mg tablet   Take by mouth once daily.    levothyroxine (SYNTHROID) 75 MCG tablet   Take 75 mcg by mouth once daily.    nebivoloL (BYSTOLIC) 10 MG Tab   Take 10 mg by mouth every evening.    ondansetron (ZOFRAN-ODT) 4 MG TbDL   Take 1 tablet (4 mg total) by mouth every 6 (six) hours as needed.    prochlorperazine (COMPAZINE) 10 MG tablet Take 1 tablet (10 mg total) by mouth every 6 (six) hours as needed.         Francy" Al  EXT [id number]                  .

## 2022-09-25 NOTE — ASSESSMENT & PLAN NOTE
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 tertiary hyperparathyroidism  - Corrected calcium of 11.9 this morning, patient in good spirits, appetite slowly improving  - Continue cincacalcet (Sensapar) 30 mg twice daily thru today, anticipate stopping tomorrow  - Will assess response to these measures, if insufficient improvement tomorrow morning, will consider Reclast  - Hold HCTZ in the hospital and at discharge   - Replete vitamin-D, recommend cholecalciferol 2000 IU daily with recheck vitamin D level outpatient in 2 months (mid December)  - Continue IV fluids and encourage oral hydration, monitor for signs of volume overload    - Outpatient workup with 24 hour urine calcium to assess for FHH (familial hypocalciuric hypercalcemia) after 3 months off hydrochlorothiazide (though very low suspicion)

## 2022-09-25 NOTE — ASSESSMENT & PLAN NOTE
82 year old female with HLD, HTN, OLGA, and Thyroid disease admitted for evaluation and management of month-long intractable N/V with electrolyte derangements. Patient with decreased PO tolerance, non-bloody bilious vomit, decreased urine output, lethargy, weakness, and 16lb weight loss in past 2-3 weeks. CMP remarkable for several electrolyte abnormalities: hypercalcemia, hypokalemia, hyponatremia, metabolic acidosis, and hypomagnesemia. CBC was remarkable for Hb of 8.7 and elevated troponin of 0.54 and 0.72. BNP was elevated to 209.     Hypercalcemia, likely 2/2 hyperparathyroidism, causing N/V and decreased GI motility leading to patients clinical presentation with PO intolerance and secondary electrolyte derangements.     Plan:  - electrolytes repleted  - endocrinology consulted for hypercalcemia, f/u recs  - f/u PTH level, unable to order Vit D level inpatient  - IVF for hypercalcemia in the meantime  - pending phosphorus, will replete if necessary  - daily CMP with Mag and Phos, replete electrolytes as needed

## 2022-09-25 NOTE — ASSESSMENT & PLAN NOTE
- Continue Levothyroxine 50 mcg daily  - Recheck TSH outpatient in 4 weeks (mid October) to ensure no overtreatment in this elderly patient

## 2022-09-25 NOTE — ASSESSMENT & PLAN NOTE
Intractable N/V with decreased PO tolerance    - first line anti-emetic IV compazine   - second line anti-emetic phenergan suppository  - pending 1L NS bolus then maintenance fluids with continuous NS at 100 ml/hr  - clear liquid diet advance as tolerated  - daily EKG's to monitor QTc

## 2022-09-25 NOTE — ASSESSMENT & PLAN NOTE
Key History and Diagnostic Findings  History of elevated calcium levels since 2021 on lab review.  Recent admission for hypercalcemia among other problems 08/22 with PTH elevated at the time.  No intervention had until recently outpatient where her PCP/Nephrologist started Sensipar once daily.  Still has remained on HCTZ however.  Calcium and PTH along with phosphorous levels are more consistent with primary hyperparathyroidism.  Vitamin D levels have been low as well.        Plan  - Would recommend cincacalcet be increased to 30 mg BID  - Continue to hold HCTZ in the hospital and at discharge   - Replete vitamin-D, recommend Vit D3 8956-7090 IU once daily.  Recheck outpatient in 1-2 months.  - Continue with IV fluids for now as tolerated and encourage oral hydration    - Should have outpatient workup with 24 hour urine calcium collection to assess for FHH (familial hypocalciuric hypercalcemia), not needed inpatient  - Monitor metabolic/renal panel inpatient  - Can be followed outpatient further for this in endocrine clinic as well    Overall concern is for primary hyperparathyroidism for which we would recommend medical therapy intiially given advanced age of the patient.  Please reach out to endocrinology with any further questions.

## 2022-09-25 NOTE — MEDICAL/APP STUDENT
Teaching Service Information    - Hypercalcemia is a common problem. Primary hyperparathyroidism and malignancy are the most common causes, accounting for more than 90% of cases  - Primary hyperparathyroidism is often asymptomatic and usually associated with mild hypercalcemia (calcium usually below 11 mg/dL)  - Calcium above 13 mg/dL is unusual in primary hyperparathyroidism, (though it can occur) and is more common in malignancy-associated hypercalcemia  - Symptoms of hypercalcemia include neurologic symptoms, nephrolithiasis, osteopenia/osteoporosis, polyuria/polydipsia, constipation, bone pain, and fatigue    Diagnosis  - The first step of the diagnostic workup is to ensure there is a repeated serum calcium to confirm hypercalcemia. If confirmed, then see if hypercalcemia is PTH-dependent or PTH-independent by checking a concurrent PTH level  - Ensure patient is not on hydrochlorothiazide, chlorthalidone, lithium, teriparatide, or other medications that could raise the serum calcium and interfere with diagnostic testing. If they are on these medications, they must be off for 3 months before accurate diagnostic labs can be obtained    - If hypercalcemia is present with elevated PTH (more than 40 pg/ml), then it is PTH-dependent and the diagnosis of hyperparathyroidism is made  - However, even with elevated calcium and elevated PTH, the rare possibility of familial hypocalciuric hypercalcemia (FHH) must be considered. This is a mutation in the calcium sensing receptor and should be ruled out with a 24 hour urine calcium collection (usually done outpatient). This is important because treatment for primary hyperthyroidism and FHH is different    - If hypercalcemia is confirmed but the PTH is appropriately low, then it is PTH-independent hypercalcemia. Measure PTHrP, vitamin-D 1,25, and vitamin-D 25 level to assess for hypercalcemia of malignancy (if PTHrP is elevated), granulomatous disease or lymphoma (if vitamin-D  1,25 is elevated), or hypervitaminosis D (if vitamin-D 25 elevated)  - In patients with elevated 1,25-dihydroxyvitamin D, a chest XR to look for malignancy or sarcoidosis may be helpful. (Granulomatous disease or lymphoma generally shows widespread pulmonary and extrapulmonary disease)  - If clinical picture fits and suspicion is high for multiple myeloma, check SPEP/UPEP with free light chain as well as TSH to assess for potential hyperthyroidism and vitamin-A to assess for vitamin-A intoxication    - Review diet/medications (prescription and nonprescription drugs, herbal preparations, calcium and vitamin D supplements) to assess for drug-induced hypercalcemia, milk-alkali syndrome, and hypervitaminosis D  - Supportive labs: chloride above 103 mEq/L (associated with a mild fall in the serum bicarbonate concentration) is consistent with primary hyperparathyroidism, while a lower serum chloride concentration and metabolic alkalosis are characteristic of milk-alkali syndrome    - Pseudohypercalcemia (or factitious hypercalcemia) may occur with hyperalbuminemia (usually due to severe dehydration) as there may be an elevation in calcium due to increased binding to albumin, ionized calcium will be normal    Treatment    - Patients with asymptomatic or mildly symptomatic hypercalcemia with calcium less than 12 mg/dL do not require immediate treatment  - Similarly, a calcium of 12 to 14 mg/dL may be well tolerated chronically and may not require immediate treatment  - However, an acute rise to these concentrations may cause neurologic symptoms, which requires more aggressive measures. In addition, patients with calcium greater than 14 mg/dL require treatment, regardless of symptoms    - Treatment is aimed at lowering the calcium and treating the underlying disease  - Effective treatments reduce calcium by:  - Increasing urinary calcium excretion (vigorous hydration)  - Inhibiting bone resorption (zoledronic acid (Reclast)  or denosumab (Prolia))  - Decreasing intestinal calcium absorption (eliminating calcium supplements)    - Adequate hydration (at least six to eight glasses of water per day) is recommended in all cases to help minimize the risk of nephrolithiasis  - The acute therapy of patients with severe hypercalcemia consists of:  - Volume expansion with isotonic saline at an initial rate of 200 to 300 mL/hour that is then adjusted to maintain the urine output at 100 to 150 mL/hour to promote urinary calcium excretion (be sure to monitor patient carefully while on high volume drip for signs of volume overload)  - Intranasal calcitonin (4 IU/kg) to functionally antagonize the effects of PTH, directly inhibit osteoclastic bone resorption, and promote renal excretion of calcium, phosphate, sodium, magnesium, and potassium by decreasing tubular reabsorption. Patients may develop tachyphylaxis to calcitonin after 24-48 hours, so therapy is usually limited to this time period as a temporizing measure and then discontinued  - Do not confuse calcitonin with cinacalcet (Sensapar) which is used to reduce calcium in cases of parathyroid carcinoma and in hemodialysis patients with an elevated calcium-phosphorous product and secondary hyperparathyroidism. Cinacalcet is not standard therapy for primary hyperparathyroidism  - Concurrent administration of Zoledronic Acid (Reclast) (4 mg intravenously over 15-60 minutes) if renal function intact to inhibit bone resorption by inhibiting osteoclasts and slowing skeletal calcium release induced by tumors/bony metastasis. This takes 48-72 hours to take full effect  - If severe renal impairment, denosumab (Prolia) can be used instead of zoledronic acid. Prolia is a monoclonal antibody with affinity for RANKL which is secreted by osteoblasts and in turn activates osteoclasts which cause bone turnover. Denosumab binds RANKL and blocks osteoclast formation thus slowing bone turnover  - Avoidance of  calcium-containing foods/supplements    - If very severe, symptomatic hypercalcemia, additional aggressive measures may be necessary. Hemodialysis should be considered, in addition to the above treatments, if calcium greater than 18 mg/dL and neurologic symptoms but stable circulation or in those with severe hypercalcemia complicated by CKD    - Parathyroid carcinoma is a rare cause of hyperparathyroidism. Patients typically present with marked hypercalcemia and very high parathyroid hormone concentrations or a neck mass. The primary treatment of parathyroid carcinoma is surgery    - Granulomatous causes of hypercalcemia such as lymphoma or sarcoidosis (most common cause of granulomatous-hypercalcemia) cause increased endogenous calcitriol (activated vitamin D 1,25) production from granuloma epithelial cells that cause enhanced intestinal calcium absorption (and, to a lesser extent, skeletal resorption)  - Therapy includes a low calcium diet, corticosteroids, and treating underlying disease  - Glucocorticoids (prednisone 20 to 40 mg/day) will usually reduce calcium within 2-5 days by decreasing calcitriol production by the activated granulomatous mononuclear cells in the lung and lymph nodes.   - Bisphosphonates are not as effective in this scenario because it does not address the underlying cause of the hypercalcemia

## 2022-09-25 NOTE — ASSESSMENT & PLAN NOTE
82 year old female with HLD, HTN, OLGA, and Thyroid disease admitted for evaluation and management of month-long intractable N/V with electrolyte derangements. Patient with decreased PO tolerance, non-bloody bilious vomit, decreased urine output, lethargy, weakness, and 16lb weight loss in past 2-3 weeks. CMP remarkable for several electrolyte abnormalities: hypercalcemia, hypokalemia, hyponatremia, metabolic acidosis, and hypomagnesemia. CBC was remarkable for Hb of 8.7 and elevated troponin of 0.54 and 0.72. BNP was elevated to 209.     Hypercalcemia, likely 2/2 primary hyperparathyroidism, causing N/V and decreased GI motility leading to patients clinical presentation with PO intolerance and secondary electrolyte derangements.     - PTH level inappropriately elevated  - unable to order Vit D level inpatient    Plan:  - electrolytes repleted  - endocrinology consulted for hypercalcemia, f/u recs  - cincacalcet increased to 30 mg BID; consider Reclast if not improved by tomorrow   - hold HCTZ in the hospital and at discharge   - Replete vitamin-D, recommend Vit D3 6080-4427 IU once daily.  Recheck outpatient in 1-2 months.  - Continue with IV fluids for now as tolerated and encourage oral hydration  - 24 hr calcium urine collection to be done outpatient after 3 months off of HCTZ   - daily CMP with Mag and Phos, replete electrolytes as needed

## 2022-09-25 NOTE — H&P
[facility] - Emergency Dept  [facility] Medicine  History & Physical    Patient Name: Elina Jerez  MRN: [id number]  Patient Class: IP- Inpatient  Admission Date: 9/24/2022  Attending Physician: Han Frazier MD   Primary Care Provider: Carlos Tony MD         Patient information was obtained from patient, relative(s), past medical records and ER records.     Subjective:     Principal Problem:<principal problem not specified>    Chief Complaint:   Chief Complaint   Patient presents with    Vomiting     N/v off and on for a month; has been admitted for same problem.  Unable to keep anything down        HPI: 82 year old female with HLD, HTN, OLGA, and Thyroid disease presented to ED with intractable nausea and vomiting. She reports a 4 week history of nausea and vomiting unrelieved by anti-emetics. She has had PO intolerance due to the N/V but does not report any association of symptoms with the type of food or beverage she consumes. Her vomit has always been non-bloody and today her vomit was bilious. She endorses difficulty having bowel movements and constipation as well as decreased urine output secondary to her decreased PO intake. She claims to have an appetite that waxes and wanes but does not have any food aversion and notes that salad tends to be a food she can keep down. She endorses lethargy, weakness, and a 16lb weight loss in past 2-3 weeks. She denies any fever, chills, diarrhea, difficulty swallowing, chest pain, SOB, blood in her stool or vomit.     Seen 9/13 for similar complaints, CT at the time not concerning for obstruction and she was sent home with zofran and compazine. Prior to that, she was in the ICU for electrolyte derangements related to similar episode.    In the ED, she was mildly hypotensive on arrival to 90's/40's with a CMP remarkable for several electrolyte abnormalities: hypercalcemia to 12.3, hypokalemia to 3.2, hyponatremia to 131, metabolic acidosis with bicarb of 16, and  hypomagnesemia of 1.2. CBC was remarkable for Hb of 8.7 and elevated troponin of 0.54 and 0.72. Her BNP was elevated to 209.    She was admitted to hospital medicine for further evaluation and management of intractable N/V with electrolyte derangements.      Past Medical History:   Diagnosis Date    High cholesterol     Hypertension     Sleep apnea     Thyroid disease        Past Surgical History:   Procedure Laterality Date    ESOPHAGOGASTRODUODENOSCOPY  08/13/2018    ESOPHAGOGASTRODUODENOSCOPY N/A 8/13/2018    Procedure: EGD (ESOPHAGOGASTRODUODENOSCOPY);  Surgeon: Han Garcia MD;  Location: [facility];  Service: Endoscopy;  Laterality: N/A;    HYSTERECTOMY         Review of patient's allergies indicates:   Allergen Reactions    Banana     Pork/porcine containing products Diarrhea    Shellfish containing products Diarrhea       Current Facility-Administered Medications on File Prior to Encounter   Medication    lactated ringers infusion     Current Outpatient Medications on File Prior to Encounter   Medication Sig    aliskiren (TEKTURNA) 300 MG Tab Take by mouth once daily.    allopurinoL (ZYLOPRIM) 300 MG tablet Take 300 mg by mouth once daily.    amLODIPine (NORVASC) 10 MG tablet Take 10 mg by mouth once daily.    atorvastatin (LIPITOR) 20 MG tablet Take 20 mg by mouth once daily.    cloNIDine (CATAPRES) 0.2 MG tablet Take 0.2 mg by mouth 2 (two) times daily.    famotidine (PEPCID) 20 MG tablet Take 1 tablet (20 mg total) by mouth Daily.    ferrous sulfate 325 (65 FE) MG EC tablet Take 325 mg by mouth 3 (three) times daily with meals.    hydroCHLOROthiazide (HYDRODIURIL) 25 MG tablet Take 25 mg by mouth once daily.    HYDROcodone-acetaminophen (NORCO) 5-325 mg per tablet Take 1 tablet by mouth every 6 (six) hours as needed for Pain. (Patient not taking: Reported on 9/6/2022)    hydroxychloroquine (PLAQUENIL) 200 mg tablet Take by mouth once daily.    levothyroxine (SYNTHROID) 50 MCG tablet Take 50 mcg by mouth  once daily.    montelukast (SINGULAIR) 10 mg tablet Take 10 mg by mouth every evening.    nebivoloL (BYSTOLIC) 10 MG Tab Take 10 mg by mouth every evening.    ondansetron (ZOFRAN-ODT) 4 MG TbDL Take 1 tablet (4 mg total) by mouth every 6 (six) hours as needed.    prochlorperazine (COMPAZINE) 10 MG tablet Take 1 tablet (10 mg total) by mouth every 6 (six) hours as needed.     Family History    None       Tobacco Use    Smoking status: Never    Smokeless tobacco: Never   Substance and Sexual Activity    Alcohol use: No    Drug use: No    Sexual activity: Never     Review of Systems   Constitutional:  Positive for fatigue and unexpected weight change (16 lb weight loss on 2-3 weeks). Negative for chills and fever.   HENT:  Positive for rhinorrhea. Negative for congestion, sore throat and trouble swallowing.    Eyes:  Negative for pain and visual disturbance.   Respiratory:  Positive for cough (dry). Negative for shortness of breath.    Cardiovascular:  Negative for chest pain.   Gastrointestinal:  Positive for abdominal pain (intermittent, relieved with bowel movements), constipation, nausea and vomiting. Negative for blood in stool and diarrhea.   Endocrine: Positive for cold intolerance. Negative for polyuria.   Genitourinary:  Negative for difficulty urinating and dysuria.   Musculoskeletal:  Positive for arthralgias (hip pain). Negative for myalgias.   Skin:  Negative for rash and wound.   Allergic/Immunologic: Positive for environmental allergies and food allergies.   Neurological:  Positive for weakness. Negative for dizziness and light-headedness.   Psychiatric/Behavioral:  Negative for confusion and decreased concentration.    Objective:     Vital Signs (Most Recent):  Temp: 97.8 °F (36.6 °C) (09/24/22 1337)  Pulse: 88 (09/24/22 1337)  Resp: 18 (09/24/22 1337)  BP: (!) 110/52 (09/24/22 1337)  SpO2: 96 % (09/24/22 1337)   Vital Signs (24h Range):  Temp:  [97.8 °F (36.6 °C)] 97.8 °F (36.6 °C)  Pulse:  [88]  88  Resp:  [18] 18  SpO2:  [96 %] 96 %  BP: (110)/(52) 110/52     Weight: 58.5 kg (129 lb)  Body mass index is 21.47 kg/m².    Physical Exam  Vitals and nursing note reviewed.   Constitutional:       General: She is not in acute distress.     Appearance: Normal appearance. She is not ill-appearing or diaphoretic.   HENT:      Right Ear: External ear normal.      Left Ear: External ear normal.      Nose: Nose normal.      Mouth/Throat:      Mouth: Mucous membranes are moist.      Pharynx: Oropharynx is clear. No oropharyngeal exudate.   Eyes:      General: No scleral icterus.        Right eye: No discharge.         Left eye: No discharge.      Extraocular Movements: Extraocular movements intact.      Conjunctiva/sclera: Conjunctivae normal.   Cardiovascular:      Rate and Rhythm: Normal rate and regular rhythm.      Heart sounds: Normal heart sounds. No murmur heard.  Pulmonary:      Effort: Pulmonary effort is normal. No respiratory distress.      Breath sounds: Normal breath sounds. No wheezing or rales.   Abdominal:      General: Abdomen is flat. Bowel sounds are normal. There is no distension.      Palpations: There is no mass.      Tenderness: There is no abdominal tenderness.   Musculoskeletal:         General: No swelling or deformity. Normal range of motion.      Cervical back: Normal range of motion and neck supple.      Right lower leg: No edema.      Left lower leg: No edema.   Skin:     General: Skin is warm.      Capillary Refill: Capillary refill takes 2 to 3 seconds.      Coloration: Skin is not jaundiced.      Findings: No bruising.   Neurological:      Mental Status: She is alert and oriented to person, place, and time.   Psychiatric:         Mood and Affect: Mood normal.         Behavior: Behavior normal.         Thought Content: Thought content normal.           Significant Labs: All pertinent labs within the past 24 hours have been reviewed.    Significant Imaging: I have reviewed all pertinent  imaging results/findings within the past 24 hours.    Assessment/Plan:     * Electrolyte abnormality  82 year old female with HLD, HTN, OLGA, and Thyroid disease admitted for evaluation and management of month-long intractable N/V with electrolyte derangements. Patient with decreased PO tolerance, non-bloody bilious vomit, decreased urine output, lethargy, weakness, and 16lb weight loss in past 2-3 weeks. CMP remarkable for several electrolyte abnormalities: hypercalcemia, hypokalemia, hyponatremia, metabolic acidosis, and hypomagnesemia. CBC was remarkable for Hb of 8.7 and elevated troponin of 0.54 and 0.72. BNP was elevated to 209.     Plan:  - K repleted  - Mg repleted  - IVF for hypercalcemia  - pending phosphorus, will replete if necessary  - daily CMP with Mag and Phos, replete electrolytes as needed      Intractable nausea and vomiting  Intractable N/V with decreased PO tolerance    - first line anti-emetic IV compazine   - second line anti-emetic phenergan suppository  - pending 1L NS bolus then maintenance fluids with continuous NS at 100 ml/hr  - clear liquid diet advance as tolerated  - daily EKG's to monitor QTc      KHOI (iron deficiency anemia)  Hb on arrival 8.7, will trend    - continue home iron supplement  - daily CBC, trend Hb  - if Hb < 7, transfuse RBC      Constipation  - daily miralax and doc-senna      Hypomagnesemia  - see electrolyte abnormality      Hyponatremia  - see electrolyte abnormality    Hypokalemia  - see electrolyte abnormality      Hypercalcemia  - see electrolyte abnormality      Benign essential HTN  Home medications: nebivolol 10mg nightly, HCTZ 25mg daily, amlodipine 10mg daily    - hold antihypertensives in the setting of volume depletion and initially soft blood pressures  - will restart as clinically indicated      VTE Risk Mitigation (From admission, onward)           Ordered     IP VTE HIGH RISK PATIENT  Once         09/24/22 1833     Place sequential compression device   Until discontinued         09/24/22 9032                       Manisha Smith MD  Department of Hospital Medicine   [facility] - Emergency Dept

## 2022-09-25 NOTE — ED TRIAGE NOTES
82-YEAR-OLD FEMALE WITH PAST MEDICAL HISTORY of hypertension, thyroid disease presents for evaluation of nausea and vomiting.  Reports symptoms have been intermittent for the last month.  She was recently admitted to the hospital for similar symptoms.  She states that she has not been able to eat and drink normally.  She reports that she is taking nausea medication without relief. She is unsure that any foods make it worse.  She reports several episodes of vomiting today. There is no blood in the vomit but it is yellow in color. She reports decreased urine output.

## 2022-09-25 NOTE — PLAN OF CARE
Problem: Adult Inpatient Plan of Care  Goal: Plan of Care Review  Outcome: Ongoing, Progressing  Goal: Patient-Specific Goal (Individualized)  Outcome: Ongoing, Progressing  Goal: Absence of Hospital-Acquired Illness or Injury  Outcome: Ongoing, Progressing  Goal: Optimal Comfort and Wellbeing  Outcome: Ongoing, Progressing  Goal: Readiness for Transition of Care  Outcome: Ongoing, Progressing     Problem: Fluid and Electrolyte Imbalance (Acute Kidney Injury/Impairment)  Goal: Fluid and Electrolyte Balance  Outcome: Ongoing, Progressing     Problem: Oral Intake Inadequate (Acute Kidney Injury/Impairment)  Goal: Optimal Nutrition Intake  Outcome: Ongoing, Progressing     Problem: Renal Function Impairment (Acute Kidney Injury/Impairment)  Goal: Effective Renal Function  Outcome: Ongoing, Progressing     Problem: Skin Injury Risk Increased  Goal: Skin Health and Integrity  Outcome: Ongoing, Progressing

## 2022-09-25 NOTE — ASSESSMENT & PLAN NOTE
82 year old female with HLD, HTN, OLGA, and Thyroid disease admitted for evaluation and management of month-long intractable N/V with electrolyte derangements. Patient with decreased PO tolerance, non-bloody bilious vomit, decreased urine output, lethargy, weakness, and 16lb weight loss in past 2-3 weeks. CMP remarkable for several electrolyte abnormalities: hypercalcemia, hypokalemia, hyponatremia, metabolic acidosis, and hypomagnesemia. CBC was remarkable for Hb of 8.7 and elevated troponin of 0.54 and 0.72. BNP was elevated to 209.     Plan:  - K repleted  - Mg repleted  - IVF for hypercalcemia  - pending phosphorus, will replete if necessary  - daily CMP with Mag and Phos, replete electrolytes as needed

## 2022-09-25 NOTE — ASSESSMENT & PLAN NOTE
Recent labs consistent with low Vitamin D.  As noted under hypercalcemia this needs replacement with future lab check.

## 2022-09-25 NOTE — CONSULTS
[facility] - Telemetry Stepdown  Endocrinology  Consult Note    Consult Requested by: Han Frazier MD   Reason for admit: Electrolyte abnormality    HISTORY OF PRESENT ILLNESS:  82 year old female with HLD, HTN, OLGA, CKD and hypothyroidism who presented to ED with intractable nausea and vomiting.  She had been having multiple weeks of nausea and vomiting which were unrelieved by antiemetics.  P.o. intake has been decreased as result of this.  She has also had complaints of constipation and decreased urine output.  Along with that she has been lethargic with weakness.  She was seen recently on 09/13/2022 in the ED for similar complaints for which she was placed on anti medics after a CT scan was unremarkable.  Prior to this the end of August she was in the ICU for electrolyte abnormalities including hypercalcemia but it does not appear further workup was completed due to the fact the patient has been on Tums multiple times a day for GERD symptoms.  She now presents again with hypercalcemia and Endocrinology is consulted for our assistance the recommendations.    She is examined at bedside with her daughter in the room.  They deny any known history of hypercalcemia prior to 1 month ago.  She does admit that she follows with a PCP/nephrologist, Dr. Carlos Tony.  He has her on hydrochlorothiazide for hypertension and recently placed her on Sensipar within the past week.  She states that she only took 2 doses of this medicine before this hospitalization.  She denies history of osteoporosis, hypercalciuria or any calcium supplementation.  She no longer is on Tums for her reflux.  She has been told that prior imaging has shown concern for nephrolithiasis but these are asymptomatic for her.  She admits to history of rheumatoid arthritis but denies any particular bone pains.    Initial labs demonstrated a corrected calcium of 12.5, phosphorus of 2.3, creatinine of 4 and EGFR of 10.  She was placed on IV fluids and home  dose of Sensipar was restarted.  Additional labs reviewed included PTH level which was greater than 300 the end of August.  Also vitamin D levels have been low recently without supplementation outpatient.      Regarding Hypercalcemia and/or Primary Hyperparathyroidism:    - Notable for elevated calcium levels since 2021 on lab review  -   Lab Results   Component Value Date    .1 (H) 08/28/2022    .0 (H) 08/27/2022    CALCIUM 11.8 (H) 09/25/2022    CALCIUM 12.3 (HH) 09/24/2022    CALCIUM 11.6 (H) 09/13/2022    ALBUMIN 3.3 (L) 09/25/2022    ALBUMIN 3.7 09/24/2022    ALBUMIN 4.1 09/13/2022    PHOS 1.7 (L) 09/25/2022    PHOS 2.3 (L) 09/24/2022    PHOS 2.7 09/02/2022    ALKPHOS 50 (L) 09/25/2022    ALKPHOS 52 (L) 09/24/2022    ALKPHOS 57 09/13/2022    TSH 0.413 09/24/2022    TSH 6.31 (H) 03/30/2021    TSH 7.87 (H) 02/03/2020    [id number] 7 (L) 08/29/2022    ESTGFRAFRICA 26.6 (A) 03/30/2021    ESTGFRAFRICA 25.2 (A) 02/03/2020    EGFRNONAA 23.1 (A) 03/30/2021    EGFRNONAA 21.9 (A) 02/03/2020    LABCALC 3.2 08/29/2022    [id number] 0 (L) 08/29/2022       - Daily intake of calcium: Dietary only  - Daily intake of vitamin D: None  - Taking lithium or hydrochlorothiazide: yes   - Most recent DEXA: Reports normal exam in the past 5-6 weeks    - Surgical indications:  Possibly left sided renal calculi on CT scan.  Impaired GFR.  Serum calcium greater than 11.5.      - Current symptoms:  Fatigue, weakness, anorexia, N/V.          Medications and/or Treatments Impacting Glycemic Control:  Immunotherapy:    Immunosuppressants     None        Steroids:   Hormones (From admission, onward)    None        Pressors:    Autonomic Drugs (From admission, onward)    None          Medications Prior to Admission   Medication Sig Dispense Refill Last Dose    acetaminophen-codeine 300-30mg (TYLENOL #3) 300-30 mg Tab Take 1 tablet by mouth every 8 (eight) hours as needed (pain).       aliskiren (TEKTURNA) 300 MG Tab Take by  mouth once daily.   9/23/2022    allopurinoL (ZYLOPRIM) 300 MG tablet Take 300 mg by mouth once daily.   9/23/2022    amLODIPine (NORVASC) 10 MG tablet Take 10 mg by mouth once daily.   9/23/2022    atorvastatin (LIPITOR) 20 MG tablet Take 20 mg by mouth once daily.   9/23/2022    cinacalcet (SENSIPAR) 30 MG Tab Take 30 mg by mouth once daily.       famotidine (PEPCID) 20 MG tablet Take 1 tablet (20 mg total) by mouth Daily. 30 tablet 11 9/23/2022    hydroCHLOROthiazide (HYDRODIURIL) 25 MG tablet Take 25 mg by mouth once daily.   9/23/2022    hydroxychloroquine (PLAQUENIL) 200 mg tablet Take by mouth once daily.   9/23/2022    levothyroxine (SYNTHROID) 75 MCG tablet Take 75 mcg by mouth once daily.   9/23/2022    nebivoloL (BYSTOLIC) 10 MG Tab Take 10 mg by mouth every evening.   9/23/2022    ondansetron (ZOFRAN-ODT) 4 MG TbDL Take 1 tablet (4 mg total) by mouth every 6 (six) hours as needed. 15 tablet 0 9/23/2022    prochlorperazine (COMPAZINE) 10 MG tablet Take 1 tablet (10 mg total) by mouth every 6 (six) hours as needed. 15 tablet 0 9/23/2022       Current Facility-Administered Medications   Medication Dose Route Frequency Provider Last Rate Last Admin    0.9%  NaCl infusion   Intravenous Continuous Manisha Smith  mL/hr at 09/25/22 1140 New Bag at 09/25/22 1140    acetaminophen tablet 650 mg  650 mg Oral Q6H PRN Carl Blue DO        aliskiren tablet 300 mg  300 mg Oral Daily Manisha Smith MD        allopurinoL tablet 300 mg  300 mg Oral with lunch Carl Blue DO   300 mg at 09/25/22 1138    atorvastatin tablet 20 mg  20 mg Oral QHS Carl Blue DO        cinacalcet tablet 30 mg  30 mg Oral Daily Farrah Nuno MD   30 mg at 09/25/22 1138    famotidine tablet 20 mg  20 mg Oral Daily Manisha Smith MD        ferrous sulfate tablet 1 each  1 tablet Oral TID  Manisha Smtih MD   1 each at 09/25/22 1138    glucagon (human recombinant) injection 1 mg  1 mg  Intramuscular PRN Manisha Smith MD        glucose chewable tablet 16 g  16 g Oral PRN Manisha Smith MD        glucose chewable tablet 24 g  24 g Oral PRN Manisha Smith MD        HYDROcodone-acetaminophen 5-325 mg per tablet 1 tablet  1 tablet Oral Q6H PRN Carl Blue DO   1 tablet at 09/25/22 0104    hydrOXYchloroQUINE tablet 200 mg  200 mg Oral Daily Manisha Smith MD   200 mg at 09/25/22 0948    levothyroxine tablet 50 mcg  50 mcg Oral Before breakfast Manisha Smith MD   50 mcg at 09/25/22 0539    naloxone 0.4 mg/mL injection 0.02 mg  0.02 mg Intravenous PRN Manisha Smith MD        polyethylene glycol packet 17 g  17 g Oral Daily Manisha Smith MD   17 g at 09/25/22 0948    prochlorperazine injection Soln 5 mg  5 mg Intravenous Q6H PRN Manisha Smith MD   5 mg at 09/24/22 2107    promethazine suppository 25 mg  25 mg Rectal Q6H PRN Manisha Smith MD        senna-docusate 8.6-50 mg per tablet 1 tablet  1 tablet Oral Daily Manisha Smith MD   1 tablet at 09/25/22 0948    sodium chloride 0.9% flush 10 mL  10 mL Intravenous Q12H PRN Manisha Smith MD        sodium phosphate 20.01 mmol in dextrose 5 % 250 mL IVPB  20.01 mmol Intravenous Once Manisha Smith MD   Stopped at 09/25/22 1348     Facility-Administered Medications Ordered in Other Encounters   Medication Dose Route Frequency Provider Last Rate Last Admin    lactated ringers infusion   Intravenous Continuous Han Garcia MD 15 mL/hr at 08/13/18 0734 New Bag at 08/13/18 0734       Interval HPI:   Overnight events: Admitted and placed on IV fluids.  Nausea slowly improving.    Eating:    Clear liquid diet  Nausea: Yes  Hypoglycemia and intervention: No  Fever: No  TPN and/or TF: No  If yes, type of TF/TPN and rate: N/A    PMH, PSH, FH, SH updated and reviewed     ROS:  As above    Labs Reviewed and Include:  BASELINE Creatinine:   [unfilled]  [unfilled]  [unfilled]  Recent Labs   Lab 09/25/22  0239   GLU 72    CALCIUM 11.8*   ALBUMIN 3.3*   PROT 5.8*   *   K 3.9   CO2 17*      BUN 36*   CREATININE 3.4*   ALKPHOS 50*   ALT 8*   AST 37   BILITOT 0.6     Lab Results   Component Value Date    HGBA1C 4.9 03/30/2021       Nutritional status:   Body mass index is 22.85 kg/m².  Lab Results   Component Value Date    ALBUMIN 3.3 (L) 09/25/2022    ALBUMIN 3.7 09/24/2022    ALBUMIN 4.1 09/13/2022     No results found for: PREALBUMIN    Estimated Creatinine Clearance: 10.7 mL/min (A) (based on SCr of 3.4 mg/dL (H)).      PHYSICAL EXAMINATION:  Vitals:    09/25/22 1144   BP: (!) 108/54   Pulse: 72   Resp: 18   Temp: 98 °F (36.7 °C)     Body mass index is 22.85 kg/m².    Physical Exam  Constitutional:       Appearance: Normal appearance.   HENT:      Head: Normocephalic and atraumatic.   Eyes:      Extraocular Movements: Extraocular movements intact.   Cardiovascular:      Rate and Rhythm: Normal rate.      Pulses: Normal pulses.   Pulmonary:      Effort: Pulmonary effort is normal.   Abdominal:      Palpations: Abdomen is soft.   Musculoskeletal:      Cervical back: Normal range of motion and neck supple.   Neurological:      General: No focal deficit present.      Mental Status: She is alert.   Psychiatric:         Mood and Affect: Mood normal.         Behavior: Behavior normal.     .     ASSESSMENT and PLAN:    Vitamin D deficiency  Recent labs consistent with low Vitamin D.  As noted under hypercalcemia this needs replacement with future lab check.     Acquired hypothyroidism  History of hypothyroidism.    Continue Levothyroxine at home dosing.      Intractable nausea and vomiting  Likely secondary to the hypercalcemia, IV fluids and hydration orally as tolerated.       Hypercalcemia    Key History and Diagnostic Findings  History of elevated calcium levels since 2021 on lab review.  Recent admission for hypercalcemia among other problems 08/22 with PTH elevated at the time.  No intervention had until recently  outpatient where her PCP/Nephrologist started Sensipar once daily.  Still has remained on HCTZ however.  Calcium and PTH along with phosphorous levels are more consistent with primary hyperparathyroidism.  Vitamin D levels have been low as well.        Plan  - Would recommend cincacalcet be increased to 30 mg BID  - Continue to hold HCTZ in the hospital and at discharge   - Replete vitamin-D, recommend Vit D3 7988-1561 IU once daily.  Recheck outpatient in 1-2 months.  - Continue with IV fluids for now as tolerated and encourage oral hydration    - Should have outpatient workup with 24 hour urine calcium collection to assess for FHH (familial hypocalciuric hypercalcemia), not needed inpatient  - Monitor metabolic/renal panel inpatient  - Can be followed outpatient further for this in endocrine clinic as well    Overall concern is for primary hyperparathyroidism for which we would recommend medical therapy intiially given advanced age of the patient.  Please reach out to endocrinology with any further questions.          DISCHARGE NEEDS: will assess daily    Bennie Coffey, DO  Endocrinology

## 2022-09-25 NOTE — HOSPITAL COURSE
Patient admitted for intractable N/V, PO intolerance, and electrolyte derangements. She was placed on IVF, PRN antiemetics, electrolytes were replaced, and vitamin supplementation was provided. On 9/25, patient much improved without N/V and electrolytes trending in the right direction. Endocrinology was consulted regarding patient's hypercalcemia likely 2/2 primary vs tertiary hyperparathyroidism contributing to the N/V. Endo recommended increasing cinacalcet to 30mg BID and discontinuing HCTZ entirely, with close outpatient follow-up. On 9/27, patient medically stable for discharge to home with increased dose of cincalcet to 30mg BID, instructions to discontinue HCTZ, and close follow-up outpatient with endocrinology clinic and PCP within a week. 24 hr calcium urine collection to be done outpatient after 3 months off of HCTZ, Vit D level in December, TSH in mid October, and a renal panel this Friday to recheck Calcium.

## 2022-09-26 PROBLEM — E21.0 PRIMARY HYPERPARATHYROIDISM: Status: ACTIVE | Noted: 2022-08-27

## 2022-09-26 LAB
ALBUMIN SERPL BCP-MCNC: 3 G/DL (ref 3.5–5.2)
ALP SERPL-CCNC: 50 U/L (ref 55–135)
ALT SERPL W/O P-5'-P-CCNC: 8 U/L (ref 10–44)
ANION GAP SERPL CALC-SCNC: 8 MMOL/L (ref 8–16)
AST SERPL-CCNC: 32 U/L (ref 10–40)
BASOPHILS # BLD AUTO: 0.04 K/UL (ref 0–0.2)
BASOPHILS NFR BLD: 0.7 % (ref 0–1.9)
BILIRUB SERPL-MCNC: 0.6 MG/DL (ref 0.1–1)
BUN SERPL-MCNC: 26 MG/DL (ref 8–23)
CALCIUM SERPL-MCNC: 11.1 MG/DL (ref 8.7–10.5)
CHLORIDE SERPL-SCNC: 107 MMOL/L (ref 95–110)
CO2 SERPL-SCNC: 18 MMOL/L (ref 23–29)
CREAT SERPL-MCNC: 2.8 MG/DL (ref 0.5–1.4)
DIFFERENTIAL METHOD: ABNORMAL
EOSINOPHIL # BLD AUTO: 0.3 K/UL (ref 0–0.5)
EOSINOPHIL NFR BLD: 5.7 % (ref 0–8)
ERYTHROCYTE [DISTWIDTH] IN BLOOD BY AUTOMATED COUNT: 15.4 % (ref 11.5–14.5)
EST. GFR  (NO RACE VARIABLE): 16.5 ML/MIN/1.73 M^2
GLUCOSE SERPL-MCNC: 77 MG/DL (ref 70–110)
HCT VFR BLD AUTO: 23.4 % (ref 37–48.5)
HGB BLD-MCNC: 7.9 G/DL (ref 12–16)
IMM GRANULOCYTES # BLD AUTO: 0.02 K/UL (ref 0–0.04)
IMM GRANULOCYTES NFR BLD AUTO: 0.3 % (ref 0–0.5)
LYMPHOCYTES # BLD AUTO: 1.4 K/UL (ref 1–4.8)
LYMPHOCYTES NFR BLD: 24 % (ref 18–48)
MAGNESIUM SERPL-MCNC: 1.9 MG/DL (ref 1.6–2.6)
MCH RBC QN AUTO: 30.5 PG (ref 27–31)
MCHC RBC AUTO-ENTMCNC: 33.8 G/DL (ref 32–36)
MCV RBC AUTO: 90 FL (ref 82–98)
MONOCYTES # BLD AUTO: 0.7 K/UL (ref 0.3–1)
MONOCYTES NFR BLD: 11.1 % (ref 4–15)
NEUTROPHILS # BLD AUTO: 3.4 K/UL (ref 1.8–7.7)
NEUTROPHILS NFR BLD: 58.2 % (ref 38–73)
NRBC BLD-RTO: 0 /100 WBC
PHOSPHATE SERPL-MCNC: 2.7 MG/DL (ref 2.7–4.5)
PLATELET # BLD AUTO: 182 K/UL (ref 150–450)
PMV BLD AUTO: 11.8 FL (ref 9.2–12.9)
POTASSIUM SERPL-SCNC: 3.8 MMOL/L (ref 3.5–5.1)
PROT SERPL-MCNC: 5.3 G/DL (ref 6–8.4)
RBC # BLD AUTO: 2.59 M/UL (ref 4–5.4)
SODIUM SERPL-SCNC: 133 MMOL/L (ref 136–145)
WBC # BLD AUTO: 5.84 K/UL (ref 3.9–12.7)

## 2022-09-26 PROCEDURE — 99233 SBSQ HOSP IP/OBS HIGH 50: CPT | Mod: GC,,, | Performed by: HOSPITALIST

## 2022-09-26 PROCEDURE — 93010 EKG 12-LEAD: ICD-10-PCS | Mod: ,,, | Performed by: INTERNAL MEDICINE

## 2022-09-26 PROCEDURE — 99232 PR SUBSEQUENT HOSPITAL CARE,LEVL II: ICD-10-PCS | Mod: GC,,, | Performed by: GENERAL ACUTE CARE HOSPITAL

## 2022-09-26 PROCEDURE — 25000003 PHARM REV CODE 250

## 2022-09-26 PROCEDURE — 85025 COMPLETE CBC W/AUTO DIFF WBC: CPT

## 2022-09-26 PROCEDURE — 63600175 PHARM REV CODE 636 W HCPCS: Performed by: STUDENT IN AN ORGANIZED HEALTH CARE EDUCATION/TRAINING PROGRAM

## 2022-09-26 PROCEDURE — 20600001 HC STEP DOWN PRIVATE ROOM

## 2022-09-26 PROCEDURE — 63600175 PHARM REV CODE 636 W HCPCS

## 2022-09-26 PROCEDURE — 80053 COMPREHEN METABOLIC PANEL: CPT

## 2022-09-26 PROCEDURE — 93010 ELECTROCARDIOGRAM REPORT: CPT | Mod: ,,, | Performed by: INTERNAL MEDICINE

## 2022-09-26 PROCEDURE — 99232 SBSQ HOSP IP/OBS MODERATE 35: CPT | Mod: GC,,, | Performed by: GENERAL ACUTE CARE HOSPITAL

## 2022-09-26 PROCEDURE — 83735 ASSAY OF MAGNESIUM: CPT

## 2022-09-26 PROCEDURE — 99233 PR SUBSEQUENT HOSPITAL CARE,LEVL III: ICD-10-PCS | Mod: GC,,, | Performed by: HOSPITALIST

## 2022-09-26 PROCEDURE — 36415 COLL VENOUS BLD VENIPUNCTURE: CPT

## 2022-09-26 PROCEDURE — 84100 ASSAY OF PHOSPHORUS: CPT

## 2022-09-26 PROCEDURE — 93005 ELECTROCARDIOGRAM TRACING: CPT

## 2022-09-26 RX ORDER — HEPARIN SODIUM 5000 [USP'U]/ML
5000 INJECTION, SOLUTION INTRAVENOUS; SUBCUTANEOUS EVERY 8 HOURS
Status: DISCONTINUED | OUTPATIENT
Start: 2022-09-26 | End: 2022-09-27 | Stop reason: HOSPADM

## 2022-09-26 RX ADMIN — HYDROXYCHLOROQUINE SULFATE 200 MG: 200 TABLET, FILM COATED ORAL at 08:09

## 2022-09-26 RX ADMIN — LEVOTHYROXINE SODIUM 50 MCG: 50 TABLET ORAL at 06:09

## 2022-09-26 RX ADMIN — HEPARIN SODIUM 5000 UNITS: 5000 INJECTION INTRAVENOUS; SUBCUTANEOUS at 09:09

## 2022-09-26 RX ADMIN — FERROUS SULFATE TAB 325 MG (65 MG ELEMENTAL FE) 1 EACH: 325 (65 FE) TAB at 12:09

## 2022-09-26 RX ADMIN — CHOLECALCIFEROL TAB 25 MCG (1000 UNIT) 1000 UNITS: 25 TAB at 08:09

## 2022-09-26 RX ADMIN — ALLOPURINOL 300 MG: 300 TABLET ORAL at 12:09

## 2022-09-26 RX ADMIN — ALISKIREN HEMIFUMARATE 300 MG: 150 TABLET, FILM COATED ORAL at 02:09

## 2022-09-26 RX ADMIN — CINACALCET 30 MG: 30 TABLET, FILM COATED ORAL at 09:09

## 2022-09-26 RX ADMIN — FERROUS SULFATE TAB 325 MG (65 MG ELEMENTAL FE) 1 EACH: 325 (65 FE) TAB at 08:09

## 2022-09-26 RX ADMIN — HEPARIN SODIUM 5000 UNITS: 5000 INJECTION INTRAVENOUS; SUBCUTANEOUS at 02:09

## 2022-09-26 RX ADMIN — ATORVASTATIN CALCIUM 20 MG: 20 TABLET, FILM COATED ORAL at 09:09

## 2022-09-26 RX ADMIN — PROCHLORPERAZINE EDISYLATE 5 MG: 5 INJECTION INTRAMUSCULAR; INTRAVENOUS at 01:09

## 2022-09-26 RX ADMIN — CINACALCET 30 MG: 30 TABLET, FILM COATED ORAL at 08:09

## 2022-09-26 NOTE — PLAN OF CARE
[facility] - Telemetry Stepdown  Initial Discharge Assessment       Primary Care Provider: Carlos Tony MD    Admission Diagnosis: Hypercalcemia [E83.52]  Hypokalemia [E87.6]  Hypomagnesemia [E83.42]  Vomiting [R11.10]  Hypoglycemia [E16.2]  Chest pain [R07.9]  QT prolongation [R94.31]  Abdominal pain, unspecified abdominal location [R10.9]    Admission Date: 9/24/2022  Expected Discharge Date: 9/28/2022    Discharge Barriers Identified: None    Payor: BCBS [place] MEDICARE / Plan: BCBS OUT OF STATE MEDICARE ADVANTAGE / Product Type: Medicare Advantage /     Extended Emergency Contact Information  Primary Emergency Contact: Rosas Jerez  Address: [address] [place] [address] [facility]  Home Phone: [phone]  Relation: Spouse  Secondary Emergency Contact: Dacia Jerez   [facility]  Home Phone: [phone]  Mobile Phone: [phone]  Relation: Daughter    Discharge Plan A: Home with family  Discharge Plan B: Home Health    No Pharmacies Listed    Initial Assessment (most recent)       Adult Discharge Assessment - 09/26/22 1451          Discharge Assessment    Assessment Type Discharge Planning Assessment     Confirmed/corrected address, phone number and insurance Yes     Confirmed Demographics Correct on Facesheet     Source of Information patient;family     Communicated VERNA with patient/caregiver Yes     Reason For Admission Hypercalcemia     Lives With spouse;grandchild(pietro);child(pietro), adult     Do you expect to return to your current living situation? Yes     Do you have help at home or someone to help you manage your care at home? Yes     Who are your caregiver(s) and their phone number(s)? Dacia Solitario (daughter) [phone]     Prior to hospitilization cognitive status: Alert/Oriented     Current cognitive status: Alert/Oriented     Walking or Climbing Stairs Difficulty ambulation difficulty, requires equipment     Dressing/Bathing Difficulty bathing  difficulty, requires equipment     Equipment Currently Used at Home walker, rolling;shower chair     Readmission within 30 days? Yes   Recently discharged from [facility] on 9/2.    Patient currently being followed by outpatient case management? No     Do you currently have service(s) that help you manage your care at home? No     Do you take prescription medications? Yes     Do you have prescription coverage? Yes     Do you have any problems affording any of your prescribed medications? No     Is the patient taking medications as prescribed? yes     Who is going to help you get home at discharge? Patient's family will provide transportation home.     How do you get to doctors appointments? family or friend will provide     Are you on dialysis? No     Do you take coumadin? No     Discharge Plan A Home with family     Discharge Plan B Home Health     DME Needed Upon Discharge  other (see comments)   TBD    Discharge Plan discussed with: Patient;Adult children     Discharge Barriers Identified None                   Tiffany Marroquin RN  Ext [phone]

## 2022-09-26 NOTE — SUBJECTIVE & OBJECTIVE
"Interval HPI:   Overnight events:  No acute events reported overnight  Eating:   <25%  Nausea: Yes but improving  Hypoglycemia and intervention: No  Fever: No  TPN and/or TF: No  If yes, type of TF/TPN and rate: NA    BP (!) 122/58 (BP Location: Right arm, Patient Position: Lying)   Pulse 78   Temp 98.1 °F (36.7 °C) (Oral)   Resp 20   Ht 5' 3" (1.6 m)   Wt 58.5 kg (128 lb 15.5 oz)   SpO2 95%   BMI 22.85 kg/m²     Labs Reviewed and Include    Recent Labs   Lab 09/26/22  0311   GLU 77   CALCIUM 11.1*   ALBUMIN 3.0*   PROT 5.3*   *   K 3.8   CO2 18*      BUN 26*   CREATININE 2.8*   ALKPHOS 50*   ALT 8*   AST 32   BILITOT 0.6     Lab Results   Component Value Date    WBC 5.84 09/26/2022    HGB 7.9 (L) 09/26/2022    HCT 23.4 (L) 09/26/2022    MCV 90 09/26/2022     09/26/2022     Recent Labs   Lab 09/24/22  1536   TSH 0.413     Lab Results   Component Value Date    HGBA1C 4.9 03/30/2021       Nutritional status:   Body mass index is 22.85 kg/m².  Lab Results   Component Value Date    ALBUMIN 3.0 (L) 09/26/2022    ALBUMIN 3.3 (L) 09/25/2022    ALBUMIN 3.7 09/24/2022     No results found for: PREALBUMIN    Estimated Creatinine Clearance: 13 mL/min (A) (based on SCr of 2.8 mg/dL (H)).    Accu-Checks  Recent Labs     09/24/22 2033 09/24/22 2211   POCTGLUCOSE 80 74       Current Medications and/or Treatments Impacting Glycemic Control  Immunotherapy:    Immunosuppressants       None          Steroids:   Hormones (From admission, onward)      None          Pressors:    Autonomic Drugs (From admission, onward)      None          Hyperglycemia/Diabetes Medications:   Antihyperglycemics (From admission, onward)      None          "

## 2022-09-26 NOTE — PROGRESS NOTES
[facility] - [facility] Medicine  Progress Note    Patient Name: Elina Jerez  MRN: [id number]  Patient Class: IP- Inpatient   Admission Date: 9/24/2022  Length of Stay: 2 days  Attending Physician: Han Frazier MD  Primary Care Provider: Carlos Tony MD        Subjective:     Principal Problem:Electrolyte abnormality        HPI:  82 year old female with HLD, HTN, OLGA, and Thyroid disease presented to ED with intractable nausea and vomiting. She reports a 4 week history of nausea and vomiting unrelieved by anti-emetics. She has had PO intolerance due to the N/V but does not report any association of symptoms with the type of food or beverage she consumes. Her vomit has always been non-bloody and today her vomit was bilious. She endorses difficulty having bowel movements and constipation as well as decreased urine output secondary to her decreased PO intake. She claims to have an appetite that waxes and wanes but does not have any food aversion and notes that salad tends to be a food she can keep down. She endorses lethargy, weakness, and a 16lb weight loss in past 2-3 weeks. She denies any fever, chills, diarrhea, difficulty swallowing, chest pain, SOB, blood in her stool or vomit.     Seen 9/13 for similar complaints, CT at the time not concerning for obstruction and she was sent home with zofran and compazine. Prior to that, she was in the ICU for electrolyte derangements related to similar episode.    In the ED, she was mildly hypotensive on arrival to 90's/40's with a CMP remarkable for several electrolyte abnormalities: hypercalcemia to 12.3, hypokalemia to 3.2, hyponatremia to 131, metabolic acidosis with bicarb of 16, and hypomagnesemia of 1.2. CBC was remarkable for Hb of 8.7 and elevated troponin of 0.54 and 0.72. Her BNP was elevated to 209.    She was admitted to hospital medicine for further evaluation and management of intractable N/V with electrolyte  derangements.      Overview/Hospital Course:  Patient admitted for intractable N/V, PO intolerance, and electrolyte derangements. She was placed on IVF, PRN antiemetics, electrolytes were replaced, and vitamin supplementation was provided. On 9/25, patient much improved without N/V and electrolytes trending in the right direction. Endocrinology was consulted regarding patient's hypercalcemia likely 2/2 primary hyperparathyroidism contributing to the N/V, increasing cinacalcet to 30mg BID and discontinuing HCTZ entirely.       Interval History: NAEON. Patient reports feeling well today and much improved from yesterday.Tolerating PO this AM. Will continue to monitor Ca overnight     Review of Systems   Constitutional:  Positive for fatigue and unexpected weight change. Negative for chills and fever.   HENT:  Positive for rhinorrhea. Negative for congestion, sore throat and trouble swallowing.    Eyes:  Negative for pain and visual disturbance.   Respiratory:  Negative for cough and shortness of breath.    Cardiovascular:  Negative for chest pain.   Gastrointestinal:  Negative for abdominal pain, constipation, diarrhea, nausea and vomiting.   Endocrine: Positive for cold intolerance. Negative for polyuria.   Genitourinary:  Negative for difficulty urinating and dysuria.   Musculoskeletal:  Positive for arthralgias (chronic hip pain). Negative for myalgias.   Skin:  Negative for rash and wound.   Neurological:  Negative for dizziness, weakness and light-headedness.   Psychiatric/Behavioral:  Negative for confusion and decreased concentration.    Objective:     Vital Signs (Most Recent):  Temp: 97.9 °F (36.6 °C) (09/26/22 1124)  Pulse: 79 (09/26/22 1124)  Resp: 20 (09/26/22 1124)  BP: (!) 116/57 (09/26/22 1124)  SpO2: 96 % (09/26/22 1124)   Vital Signs (24h Range):  Temp:  [97.9 °F (36.6 °C)-98.7 °F (37.1 °C)] 97.9 °F (36.6 °C)  Pulse:  [72-79] 79  Resp:  [16-20] 20  SpO2:  [84 %-96 %] 96 %  BP: (106-123)/(52-58) 116/57      Weight: 58.5 kg (128 lb 15.5 oz)  Body mass index is 22.85 kg/m².    Intake/Output Summary (Last 24 hours) at 9/26/2022 1351  Last data filed at 9/26/2022 0600  Gross per 24 hour   Intake 360 ml   Output 501 ml   Net -141 ml      Physical Exam  Vitals and nursing note reviewed.   Constitutional:       General: She is not in acute distress.     Appearance: Normal appearance. She is not ill-appearing or diaphoretic.   HENT:      Right Ear: External ear normal.      Left Ear: External ear normal.      Nose: Nose normal.   Eyes:      General:         Right eye: No discharge.         Left eye: No discharge.      Extraocular Movements: Extraocular movements intact.      Conjunctiva/sclera: Conjunctivae normal.   Cardiovascular:      Rate and Rhythm: Normal rate and regular rhythm.      Heart sounds: Normal heart sounds. No murmur heard.  Pulmonary:      Effort: Pulmonary effort is normal. No respiratory distress.      Breath sounds: Normal breath sounds. No wheezing or rales.   Abdominal:      General: Abdomen is flat. There is no distension.      Palpations: There is no mass.      Tenderness: There is no abdominal tenderness.   Musculoskeletal:         General: No swelling or deformity. Normal range of motion.      Cervical back: Normal range of motion and neck supple.      Right lower leg: No edema.      Left lower leg: No edema.   Skin:     General: Skin is warm.      Capillary Refill: Capillary refill takes 2 to 3 seconds.      Coloration: Skin is not jaundiced.      Findings: No bruising.   Neurological:      Mental Status: She is alert and oriented to person, place, and time.   Psychiatric:         Mood and Affect: Mood normal.         Behavior: Behavior normal.         Thought Content: Thought content normal.     Significant Labs: All pertinent labs within the past 24 hours have been reviewed.    Significant Imaging: I have reviewed all pertinent imaging results/findings within the past 24  hours.      Assessment/Plan:      * Electrolyte abnormality  82 year old female with HLD, HTN, OLGA, and Thyroid disease admitted for evaluation and management of month-long intractable N/V with electrolyte derangements. Patient with decreased PO tolerance, non-bloody bilious vomit, decreased urine output, lethargy, weakness, and 16lb weight loss in past 2-3 weeks. CMP remarkable for several electrolyte abnormalities: hypercalcemia, hypokalemia, hyponatremia, metabolic acidosis, and hypomagnesemia. CBC was remarkable for Hb of 8.7 and elevated troponin of 0.54 and 0.72. BNP was elevated to 209.     Hypercalcemia, likely 2/2 primary hyperparathyroidism, causing N/V and decreased GI motility leading to patients clinical presentation with PO intolerance and secondary electrolyte derangements.     - PTH level inappropriately elevated  - unable to order Vit D level inpatient    Plan:  - electrolytes repleted  - endocrinology consulted for hypercalcemia, f/u recs  - cincacalcet increased to 30 mg BID; consider Reclast if not improved by tomorrow   - hold HCTZ in the hospital and at discharge   - Replete vitamin-D, recommend Vit D3 3325-3011 IU once daily.  Recheck outpatient in 1-2 months.  - Continue with IV fluids for now as tolerated and encourage oral hydration  - 24 hr calcium urine collection to be done outpatient after 3 months off of HCTZ   - daily CMP with Mag and Phos, replete electrolytes as needed      Vitamin D deficiency  - Continue Vitamin D 1000 units QD       Acquired hypothyroidism  - Resume home levothyroxine 50 mcg QD       Constipation  Likely combination of hypercalcemia and decreased PO intake    - daily miralax and doc-senna      Hypomagnesemia  - see electrolyte abnormality      Hyponatremia  - see electrolyte abnormality    Hypokalemia  - see electrolyte abnormality      Intractable nausea and vomiting  Intractable N/V with decreased PO tolerance, N/V controlled on current PRN regiment    -  Resolved       KHOI (iron deficiency anemia)  Hb on arrival 8.7, will trend    - continue home iron supplement  - daily CBC, trend Hb  - if Hb < 7, transfuse RBC      Hypercalcemia  - see electrolyte abnormality      Benign essential HTN  Home medications: nebivolol 10mg nightly, HCTZ 25mg daily, amlodipine 10mg daily    - hold antihypertensives in the setting of volume depletion and initially soft blood pressures  - will restart as clinically indicated        VTE Risk Mitigation (From admission, onward)         Ordered     heparin (porcine) injection 5,000 Units  Every 8 hours         09/26/22 1025     IP VTE HIGH RISK PATIENT  Once         09/24/22 1833     Place sequential compression device  Until discontinued         09/24/22 1833                Discharge Planning   VERNA:      Code Status: Full Code   Is the patient medically ready for discharge?:     Reason for patient still in hospital (select all that apply): Patient trending condition, Laboratory test, Treatment and Consult recommendations                     oSnya Harden DO  Department of Hospital Medicine   [facility] - Tele[facility]

## 2022-09-26 NOTE — PLAN OF CARE
Problem: Adult Inpatient Plan of Care  Goal: Absence of Hospital-Acquired Illness or Injury  Intervention: Identify and Manage Fall Risk  Flowsheets (Taken 9/26/2022 0251)  Safety Promotion/Fall Prevention:   assistive device/personal item within reach   side rails raised x 2   bed alarm set  Intervention: Prevent Skin Injury  Flowsheets (Taken 9/26/2022 0251)  Body Position: position changed independently  Intervention: Prevent and Manage VTE (Venous Thromboembolism) Risk  Flowsheets (Taken 9/26/2022 0251)  Activity Management:   Arm raise - L1   Rolling - L1  Intervention: Prevent Infection  Flowsheets (Taken 9/26/2022 0251)  Infection Prevention: environmental surveillance performed  Goal: Optimal Comfort and Wellbeing  Intervention: Provide Person-Centered Care  Flowsheets (Taken 9/26/2022 0251)  Trust Relationship/Rapport:   care explained   choices provided   questions answered   thoughts/feelings acknowledged   Pt in bed. No s/s of distress noted at this time.  Safety measures in progress. The call light is within reach.

## 2022-09-26 NOTE — PLAN OF CARE
Problem: Adult Inpatient Plan of Care  Goal: Plan of Care Review  Outcome: Ongoing, Progressing  Goal: Patient-Specific Goal (Individualized)  Outcome: Ongoing, Progressing  Goal: Absence of Hospital-Acquired Illness or Injury  Outcome: Ongoing, Progressing  Goal: Optimal Comfort and Wellbeing  Outcome: Ongoing, Progressing  Goal: Readiness for Transition of Care  Outcome: Ongoing, Progressing     Problem: Fluid and Electrolyte Imbalance (Acute Kidney Injury/Impairment)  Goal: Fluid and Electrolyte Balance  Outcome: Ongoing, Progressing     Problem: Oral Intake Inadequate (Acute Kidney Injury/Impairment)  Goal: Optimal Nutrition Intake  Outcome: Ongoing, Progressing     Problem: Renal Function Impairment (Acute Kidney Injury/Impairment)  Goal: Effective Renal Function  Outcome: Ongoing, Progressing     Problem: Skin Injury Risk Increased  Goal: Skin Health and Integrity  Outcome: Ongoing, Progressing   Patient AAOx4. VSS. RA. NSR on the monitor. BSC in the room. Compazine x1 PRN for vomiting. NS @100 ml/hr continuous.  Care ongoing.

## 2022-09-26 NOTE — PROGRESS NOTES
[facility] - Telemetry Stepdown  Endocrinology  Progress Note    Admit Date: 9/24/2022     82 year-old female with HLD, HTN, OLGA, CKD, rheumatoid arthritis, and hypothyroidism who presented with intractable nausea and vomiting which has been ongoing for weeks and were unrelieved by antiemetics. Oral intake has been decreased as result of this and as a result had constipation and decreased urine output. Along with that she has been lethargic with weakness  - She was seen recently on 09/13/2022 in the ED for similar complaints for which she was placed on antiemetics after unremarkable CT scan. Prior to the end of August she was in the ICU for electrolyte abnormalities including hypercalcemia but further workup was not done at that time due to the fact the patient was on Tums multiple times a day for GERD  - Initial labs demonstrated a corrected calcium of 12.5, phosphorus of 2.3, creatinine of 4 and EGFR of 10.  She was placed on IV fluids and home dose of Sensipar was restarted. Additional labs reviewed included PTH level which was greater than 300 the end of August.  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 Carlos Tony and she was on hydrochlorothiazide for hypertension and placed on Sensipar within the past week but had only taken 2 doses before this hospitalization. She denies history of osteoporosis, hypercalciuria or any calcium supplementation. She no longer is on Tums for her reflux  She has been told that prior imaging showed nephrolithiasis but these are asymptomatic for her  - Notable for elevated calcium levels since 2021  -   Lab Results   Component Value Date    .1 (H) 08/28/2022    .0 (H) 08/27/2022    CALCIUM 11.8 (H) 09/25/2022    CALCIUM 12.3 (HH) 09/24/2022    CALCIUM 11.6 (H)  "09/13/2022    ALBUMIN 3.3 (L) 09/25/2022    ALBUMIN 3.7 09/24/2022    ALBUMIN 4.1 09/13/2022    PHOS 1.7 (L) 09/25/2022    PHOS 2.3 (L) 09/24/2022    PHOS 2.7 09/02/2022    ALKPHOS 50 (L) 09/25/2022    ALKPHOS 52 (L) 09/24/2022    ALKPHOS 57 09/13/2022    TSH 0.413 09/24/2022    TSH 6.31 (H) 03/30/2021    TSH 7.87 (H) 02/03/2020    DXKQGHMB39CZ 7 (L) 08/29/2022    ESTGFRAFRICA 26.6 (A) 03/30/2021    ESTGFRAFRICA 25.2 (A) 02/03/2020    EGFRNONAA 23.1 (A) 03/30/2021    EGFRNONAA 21.9 (A) 02/03/2020    LABCALC 3.2 08/29/2022    [id number] 0 (L) 08/29/2022     - Daily intake of calcium: Dietary only  - Daily intake of vitamin D: None  - Taking lithium or hydrochlorothiazide: yes   - Most recent DEXA: Reports normal exam in the past 5-6 weeks    - Surgical indications: Possibly left sided renal calculi on CT scan. Impaired GFR. Serum calcium greater than 11.5  - Current symptoms: Fatigue, weakness, anorexia, N/V      Interval HPI:   Overnight events:  No acute events reported overnight  Eating:   <25%  Nausea: Yes but improving  Hypoglycemia and intervention: No  Fever: No  TPN and/or TF: No  If yes, type of TF/TPN and rate: NA    BP (!) 122/58 (BP Location: Right arm, Patient Position: Lying)   Pulse 78   Temp 98.1 °F (36.7 °C) (Oral)   Resp 20   Ht 5' 3" (1.6 m)   Wt 58.5 kg (128 lb 15.5 oz)   SpO2 95%   BMI 22.85 kg/m²     Labs Reviewed and Include    Recent Labs   Lab 09/26/22  0311   GLU 77   CALCIUM 11.1*   ALBUMIN 3.0*   PROT 5.3*   *   K 3.8   CO2 18*      BUN 26*   CREATININE 2.8*   ALKPHOS 50*   ALT 8*   AST 32   BILITOT 0.6     Lab Results   Component Value Date    WBC 5.84 09/26/2022    HGB 7.9 (L) 09/26/2022    HCT 23.4 (L) 09/26/2022    MCV 90 09/26/2022     09/26/2022     Recent Labs   Lab 09/24/22  1536   TSH 0.413     Lab Results   Component Value Date    HGBA1C 4.9 03/30/2021       Nutritional status:   Body mass index is 22.85 kg/m².  Lab Results   Component Value Date    " ALBUMIN 3.0 (L) 09/26/2022    ALBUMIN 3.3 (L) 09/25/2022    ALBUMIN 3.7 09/24/2022     No results found for: PREALBUMIN    Estimated Creatinine Clearance: 13 mL/min (A) (based on SCr of 2.8 mg/dL (H)).    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 tertiary hyperparathyroidism  - Corrected calcium of 11.9 this morning, patient in good spirits, appetite slowly improving  - Continue cincacalcet (Sensapar) 30 mg twice daily thru today, anticipate stopping tomorrow  - Will assess response to these measures, if insufficient improvement tomorrow morning, will consider Reclast  - Hold HCTZ in the hospital and at discharge   - Replete vitamin-D, recommend cholecalciferol 2000 IU daily with recheck vitamin D level outpatient in 2 months (mid December)  - Continue IV fluids and encourage oral hydration, monitor for signs of volume overload    - Outpatient workup with 24 hour urine calcium to assess for FHH (familial hypocalciuric hypercalcemia) after 3 months off hydrochlorothiazide (though very low suspicion)    Intractable nausea and vomiting  - Likely secondary to hypercalcemia, plan as above    Acquired hypothyroidism  - Continue Levothyroxine 50 mcg daily  - Recheck TSH outpatient in 4 weeks (mid October) to ensure no overtreatment in this elderly patient    Vitamin D deficiency  - Plan as above      Rob Valdivia DO  [facility], [address]th Floor  [address] [place], [place], [address]    Office: [phone]  Fax: [phone]    Disclaimer: This note has been generated using voice-recognition software. There may be typographical errors that have been missed during proof-reading.    The above history labs imaging impression and plan were discussed with attending physician who is in agreement and also took part in this patient's care.  I personally reviewed all of the patients available medications, labs, imaging, vitals, allergies, medical history.

## 2022-09-26 NOTE — ASSESSMENT & PLAN NOTE
Intractable N/V with decreased PO tolerance, N/V controlled on current PRN regiment    - Resolved

## 2022-09-26 NOTE — SUBJECTIVE & OBJECTIVE
Interval History: NAEON. Patient reports feeling well today and much improved from yesterday.Tolerating PO this AM. Will continue to monitor Ca overnight     Review of Systems   Constitutional:  Positive for fatigue and unexpected weight change. Negative for chills and fever.   HENT:  Positive for rhinorrhea. Negative for congestion, sore throat and trouble swallowing.    Eyes:  Negative for pain and visual disturbance.   Respiratory:  Negative for cough and shortness of breath.    Cardiovascular:  Negative for chest pain.   Gastrointestinal:  Negative for abdominal pain, constipation, diarrhea, nausea and vomiting.   Endocrine: Positive for cold intolerance. Negative for polyuria.   Genitourinary:  Negative for difficulty urinating and dysuria.   Musculoskeletal:  Positive for arthralgias (chronic hip pain). Negative for myalgias.   Skin:  Negative for rash and wound.   Neurological:  Negative for dizziness, weakness and light-headedness.   Psychiatric/Behavioral:  Negative for confusion and decreased concentration.    Objective:     Vital Signs (Most Recent):  Temp: 97.9 °F (36.6 °C) (09/26/22 1124)  Pulse: 79 (09/26/22 1124)  Resp: 20 (09/26/22 1124)  BP: (!) 116/57 (09/26/22 1124)  SpO2: 96 % (09/26/22 1124)   Vital Signs (24h Range):  Temp:  [97.9 °F (36.6 °C)-98.7 °F (37.1 °C)] 97.9 °F (36.6 °C)  Pulse:  [72-79] 79  Resp:  [16-20] 20  SpO2:  [84 %-96 %] 96 %  BP: (106-123)/(52-58) 116/57     Weight: 58.5 kg (128 lb 15.5 oz)  Body mass index is 22.85 kg/m².    Intake/Output Summary (Last 24 hours) at 9/26/2022 1351  Last data filed at 9/26/2022 0600  Gross per 24 hour   Intake 360 ml   Output 501 ml   Net -141 ml      Physical Exam  Vitals and nursing note reviewed.   Constitutional:       General: She is not in acute distress.     Appearance: Normal appearance. She is not ill-appearing or diaphoretic.   HENT:      Right Ear: External ear normal.      Left Ear: External ear normal.      Nose: Nose normal.    Eyes:      General:         Right eye: No discharge.         Left eye: No discharge.      Extraocular Movements: Extraocular movements intact.      Conjunctiva/sclera: Conjunctivae normal.   Cardiovascular:      Rate and Rhythm: Normal rate and regular rhythm.      Heart sounds: Normal heart sounds. No murmur heard.  Pulmonary:      Effort: Pulmonary effort is normal. No respiratory distress.      Breath sounds: Normal breath sounds. No wheezing or rales.   Abdominal:      General: Abdomen is flat. There is no distension.      Palpations: There is no mass.      Tenderness: There is no abdominal tenderness.   Musculoskeletal:         General: No swelling or deformity. Normal range of motion.      Cervical back: Normal range of motion and neck supple.      Right lower leg: No edema.      Left lower leg: No edema.   Skin:     General: Skin is warm.      Capillary Refill: Capillary refill takes 2 to 3 seconds.      Coloration: Skin is not jaundiced.      Findings: No bruising.   Neurological:      Mental Status: She is alert and oriented to person, place, and time.   Psychiatric:         Mood and Affect: Mood normal.         Behavior: Behavior normal.         Thought Content: Thought content normal.     Significant Labs: All pertinent labs within the past 24 hours have been reviewed.    Significant Imaging: I have reviewed all pertinent imaging results/findings within the past 24 hours.

## 2022-09-26 NOTE — ASSESSMENT & PLAN NOTE
- Suspect primary hyperparathyroidism (with low phosphorus) with hypercalcemia worsened by HCTZ  - Corrected calcium of this morning of 11.6 from 11.9 yesterday  - Continue cincacalcet (Sensapar) 30 mg twice daily with fluids at 100 mL/hr   - Will assess response to these measures, recommend avoiding zoledronic acid (Reclast) due to renal function and  avoiding denosumab (Prolia) if possible to prevent potential hypocalcemia  - Hold HCTZ in the hospital and at discharge   - Replete vitamin-D, recommend cholecalciferol 2000 IU daily with recheck vitamin D level outpatient in 2 months (mid December)  - Encourage oral hydration, monitor for signs of volume overload    - Outpatient workup with 24 hour urine calcium to assess for FHH (familial hypocalciuric hypercalcemia) after 3 months off hydrochlorothiazide (though very low suspicion)

## 2022-09-27 ENCOUNTER — TELEPHONE (OUTPATIENT)
Dept: ENDOCRINOLOGY | Facility: CLINIC | Age: 82
End: 2022-09-27
Payer: MEDICARE

## 2022-09-27 VITALS
RESPIRATION RATE: 16 BRPM | HEART RATE: 76 BPM | OXYGEN SATURATION: 97 % | WEIGHT: 129 LBS | TEMPERATURE: 98 F | SYSTOLIC BLOOD PRESSURE: 118 MMHG | HEIGHT: 63 IN | BODY MASS INDEX: 22.86 KG/M2 | DIASTOLIC BLOOD PRESSURE: 56 MMHG

## 2022-09-27 LAB
ALBUMIN SERPL BCP-MCNC: 2.7 G/DL (ref 3.5–5.2)
ALP SERPL-CCNC: 49 U/L (ref 55–135)
ALT SERPL W/O P-5'-P-CCNC: 6 U/L (ref 10–44)
ANION GAP SERPL CALC-SCNC: 8 MMOL/L (ref 8–16)
AST SERPL-CCNC: 27 U/L (ref 10–40)
BASOPHILS # BLD AUTO: 0.03 K/UL (ref 0–0.2)
BASOPHILS NFR BLD: 0.7 % (ref 0–1.9)
BILIRUB SERPL-MCNC: 0.6 MG/DL (ref 0.1–1)
BUN SERPL-MCNC: 21 MG/DL (ref 8–23)
CALCIUM SERPL-MCNC: 10.6 MG/DL (ref 8.7–10.5)
CHLORIDE SERPL-SCNC: 109 MMOL/L (ref 95–110)
CO2 SERPL-SCNC: 16 MMOL/L (ref 23–29)
CREAT SERPL-MCNC: 2.3 MG/DL (ref 0.5–1.4)
DIFFERENTIAL METHOD: ABNORMAL
EOSINOPHIL # BLD AUTO: 0.4 K/UL (ref 0–0.5)
EOSINOPHIL NFR BLD: 8.1 % (ref 0–8)
ERYTHROCYTE [DISTWIDTH] IN BLOOD BY AUTOMATED COUNT: 15.5 % (ref 11.5–14.5)
EST. GFR  (NO RACE VARIABLE): 20.8 ML/MIN/1.73 M^2
GLUCOSE SERPL-MCNC: 68 MG/DL (ref 70–110)
HCT VFR BLD AUTO: 23.2 % (ref 37–48.5)
HGB BLD-MCNC: 7.7 G/DL (ref 12–16)
IMM GRANULOCYTES # BLD AUTO: 0.02 K/UL (ref 0–0.04)
IMM GRANULOCYTES NFR BLD AUTO: 0.5 % (ref 0–0.5)
LYMPHOCYTES # BLD AUTO: 1.2 K/UL (ref 1–4.8)
LYMPHOCYTES NFR BLD: 27 % (ref 18–48)
MAGNESIUM SERPL-MCNC: 1.4 MG/DL (ref 1.6–2.6)
MCH RBC QN AUTO: 30.3 PG (ref 27–31)
MCHC RBC AUTO-ENTMCNC: 33.2 G/DL (ref 32–36)
MCV RBC AUTO: 91 FL (ref 82–98)
MONOCYTES # BLD AUTO: 0.6 K/UL (ref 0.3–1)
MONOCYTES NFR BLD: 12.9 % (ref 4–15)
NEUTROPHILS # BLD AUTO: 2.2 K/UL (ref 1.8–7.7)
NEUTROPHILS NFR BLD: 50.8 % (ref 38–73)
NRBC BLD-RTO: 0 /100 WBC
PHOSPHATE SERPL-MCNC: 2.4 MG/DL (ref 2.7–4.5)
PLATELET # BLD AUTO: 185 K/UL (ref 150–450)
PMV BLD AUTO: 12.3 FL (ref 9.2–12.9)
POTASSIUM SERPL-SCNC: 3.6 MMOL/L (ref 3.5–5.1)
PROT SERPL-MCNC: 4.9 G/DL (ref 6–8.4)
RBC # BLD AUTO: 2.54 M/UL (ref 4–5.4)
SODIUM SERPL-SCNC: 133 MMOL/L (ref 136–145)
WBC # BLD AUTO: 4.34 K/UL (ref 3.9–12.7)

## 2022-09-27 PROCEDURE — 93005 ELECTROCARDIOGRAM TRACING: CPT

## 2022-09-27 PROCEDURE — 63600175 PHARM REV CODE 636 W HCPCS

## 2022-09-27 PROCEDURE — 25000003 PHARM REV CODE 250

## 2022-09-27 PROCEDURE — 36415 COLL VENOUS BLD VENIPUNCTURE: CPT

## 2022-09-27 PROCEDURE — 99232 PR SUBSEQUENT HOSPITAL CARE,LEVL II: ICD-10-PCS | Mod: GC,,, | Performed by: GENERAL ACUTE CARE HOSPITAL

## 2022-09-27 PROCEDURE — 99239 HOSP IP/OBS DSCHRG MGMT >30: CPT | Mod: GC,,, | Performed by: HOSPITALIST

## 2022-09-27 PROCEDURE — 63600175 PHARM REV CODE 636 W HCPCS: Performed by: STUDENT IN AN ORGANIZED HEALTH CARE EDUCATION/TRAINING PROGRAM

## 2022-09-27 PROCEDURE — 85025 COMPLETE CBC W/AUTO DIFF WBC: CPT

## 2022-09-27 PROCEDURE — 99232 SBSQ HOSP IP/OBS MODERATE 35: CPT | Mod: GC,,, | Performed by: GENERAL ACUTE CARE HOSPITAL

## 2022-09-27 PROCEDURE — 99239 PR HOSPITAL DISCHARGE DAY,>30 MIN: ICD-10-PCS | Mod: GC,,, | Performed by: HOSPITALIST

## 2022-09-27 PROCEDURE — 83735 ASSAY OF MAGNESIUM: CPT

## 2022-09-27 PROCEDURE — 93010 ELECTROCARDIOGRAM REPORT: CPT | Mod: ,,, | Performed by: INTERNAL MEDICINE

## 2022-09-27 PROCEDURE — 84100 ASSAY OF PHOSPHORUS: CPT

## 2022-09-27 PROCEDURE — 80053 COMPREHEN METABOLIC PANEL: CPT

## 2022-09-27 PROCEDURE — 93010 EKG 12-LEAD: ICD-10-PCS | Mod: ,,, | Performed by: INTERNAL MEDICINE

## 2022-09-27 RX ORDER — CHOLECALCIFEROL (VITAMIN D3) 25 MCG
2000 TABLET ORAL DAILY
Status: DISCONTINUED | OUTPATIENT
Start: 2022-09-27 | End: 2022-09-27 | Stop reason: HOSPADM

## 2022-09-27 RX ORDER — SODIUM,POTASSIUM PHOSPHATES 280-250MG
2 POWDER IN PACKET (EA) ORAL EVERY 4 HOURS
Status: DISCONTINUED | OUTPATIENT
Start: 2022-09-27 | End: 2022-09-27 | Stop reason: HOSPADM

## 2022-09-27 RX ORDER — ONDANSETRON 4 MG/1
4 TABLET, ORALLY DISINTEGRATING ORAL EVERY 8 HOURS PRN
Qty: 10 TABLET | Refills: 0 | Status: SHIPPED | OUTPATIENT
Start: 2022-09-27

## 2022-09-27 RX ORDER — VIT C/E/ZN/COPPR/LUTEIN/ZEAXAN 250MG-90MG
2000 CAPSULE ORAL DAILY
Qty: 60 CAPSULE | Refills: 1 | Status: SHIPPED | OUTPATIENT
Start: 2022-09-27 | End: 2023-01-26 | Stop reason: SDUPTHER

## 2022-09-27 RX ORDER — NEBIVOLOL 10 MG/1
10 TABLET ORAL DAILY
Qty: 30 TABLET | Refills: 11 | Status: SHIPPED | OUTPATIENT
Start: 2022-09-27 | End: 2023-09-27

## 2022-09-27 RX ORDER — AMLODIPINE BESYLATE 10 MG/1
10 TABLET ORAL DAILY
Qty: 30 TABLET | Refills: 0 | Status: SHIPPED | OUTPATIENT
Start: 2022-09-27

## 2022-09-27 RX ORDER — FERROUS SULFATE 325(65) MG
325 TABLET, DELAYED RELEASE (ENTERIC COATED) ORAL DAILY
Qty: 30 TABLET | Refills: 3 | Status: SHIPPED | OUTPATIENT
Start: 2022-09-27

## 2022-09-27 RX ORDER — CINACALCET 30 MG/1
30 TABLET, FILM COATED ORAL 2 TIMES DAILY
Qty: 60 TABLET | Refills: 11 | Status: SHIPPED | OUTPATIENT
Start: 2022-09-27 | End: 2023-09-27

## 2022-09-27 RX ORDER — MAGNESIUM SULFATE HEPTAHYDRATE 40 MG/ML
2 INJECTION, SOLUTION INTRAVENOUS
Status: COMPLETED | OUTPATIENT
Start: 2022-09-27 | End: 2022-09-27

## 2022-09-27 RX ADMIN — ALLOPURINOL 300 MG: 300 TABLET ORAL at 11:09

## 2022-09-27 RX ADMIN — CINACALCET 30 MG: 30 TABLET, FILM COATED ORAL at 09:09

## 2022-09-27 RX ADMIN — HEPARIN SODIUM 5000 UNITS: 5000 INJECTION INTRAVENOUS; SUBCUTANEOUS at 06:09

## 2022-09-27 RX ADMIN — POTASSIUM & SODIUM PHOSPHATES POWDER PACK 280-160-250 MG 2 PACKET: 280-160-250 PACK at 11:09

## 2022-09-27 RX ADMIN — PROCHLORPERAZINE EDISYLATE 5 MG: 5 INJECTION INTRAMUSCULAR; INTRAVENOUS at 02:09

## 2022-09-27 RX ADMIN — CHOLECALCIFEROL TAB 25 MCG (1000 UNIT) 2000 UNITS: 25 TAB at 09:09

## 2022-09-27 RX ADMIN — MAGNESIUM SULFATE 2 G: 2 INJECTION INTRAVENOUS at 11:09

## 2022-09-27 RX ADMIN — ALISKIREN HEMIFUMARATE 300 MG: 150 TABLET, FILM COATED ORAL at 09:09

## 2022-09-27 RX ADMIN — FAMOTIDINE 20 MG: 20 TABLET ORAL at 01:09

## 2022-09-27 RX ADMIN — FERROUS SULFATE TAB 325 MG (65 MG ELEMENTAL FE) 1 EACH: 325 (65 FE) TAB at 09:09

## 2022-09-27 RX ADMIN — MAGNESIUM SULFATE 2 G: 2 INJECTION INTRAVENOUS at 01:09

## 2022-09-27 RX ADMIN — LEVOTHYROXINE SODIUM 50 MCG: 50 TABLET ORAL at 06:09

## 2022-09-27 RX ADMIN — HYDROXYCHLOROQUINE SULFATE 200 MG: 200 TABLET, FILM COATED ORAL at 09:09

## 2022-09-27 NOTE — PLAN OF CARE
[facility] - Telemetry Stepdown  Discharge Final Note    Primary Care Provider: Carlos Tony MD    Expected Discharge Date: 9/27/2022      Future Appointments   Date Time Provider Department Center   10/12/2022  8:00 AM [facility]          Final Discharge Note (most recent)       Final Note - 09/27/22 1515          Final Note    Assessment Type Final Discharge Note     Anticipated Discharge Disposition Home or Self Care     What phone number can be called within the next 1-3 days to see how you are doing after discharge? [phone]     Hospital Resources/Appts/Education Provided Appointments scheduled and added to AVS        Post-Acute Status    Post-Acute Authorization Other     Other Status No Post-Acute Service Needs     Discharge Delays None known at this time                     Important Message from Medicare  Important Message from Medicare regarding Discharge Appeal Rights: Given to patient/caregiver, Explained to patient/caregiver, Signed/date by patient/caregiver     Date IMM was signed: 09/27/22  Time IMM was signed: 1311    Contact Info       Carlos Tony MD   Specialty: Nephrology   Relationship: PCP - General    [address] [place] [id number]   Phone: [phone]       Next Steps: Follow up on 10/4/2022    Instructions: Established pt's hospital f/u visit @ 11:00am. Please bring discharge summary, ID, insurance card, and medication list.            Tiffany Marroquin RN  Ext [phone]

## 2022-09-27 NOTE — PROGRESS NOTES
[facility] - Telemetry Stepdown  Endocrinology  Progress Note    Admit Date: 9/24/2022     82 year-old female with HLD, HTN, OLGA, CKD, rheumatoid arthritis, and hypothyroidism who presented with intractable nausea and vomiting which has been ongoing for weeks and were unrelieved by antiemetics. Oral intake has been decreased as result of this and as a result had constipation and decreased urine output. Along with that she has been lethargic with weakness  - She was seen recently on 09/13/2022 in the ED for similar complaints for which she was placed on antiemetics after unremarkable CT scan. Prior to the end of August she was in the ICU for electrolyte abnormalities including hypercalcemia but further workup was not done at that time due to the fact the patient was on Tums multiple times a day for GERD  - Initial labs demonstrated a corrected calcium of 12.5, phosphorus of 2.3, creatinine of 4 and EGFR of 10.  She was placed on IV fluids and home dose of Sensipar was restarted. Additional labs reviewed included PTH level which was greater than 300 the end of August.  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 Carlos Tony and she was on hydrochlorothiazide for hypertension and placed on Sensipar within the past week but had only taken 2 doses before this hospitalization. She denies history of osteoporosis, hypercalciuria or any calcium supplementation. She no longer is on Tums for her reflux  She has been told that prior imaging showed nephrolithiasis but these are asymptomatic for her  - Notable for elevated calcium levels since 2021  -   Lab Results   Component Value Date    .3 (H) 09/25/2022    .1 (H) 08/28/2022    .0 (H) 08/27/2022    CALCIUM 10.6 (H) 09/27/2022    CALCIUM 11.1 (H)  "2022    CALCIUM 11.8 (H) 2022    ALBUMIN 2.7 (L) 2022    ALBUMIN 3.0 (L) 2022    ALBUMIN 3.3 (L) 2022    PHOS 2.4 (L) 2022    PHOS 2.7 2022    PHOS 1.7 (L) 2022    ALKPHOS 49 (L) 2022    ALKPHOS 50 (L) 2022    ALKPHOS 50 (L) 2022    TSH 0.413 2022    TSH 6.31 (H) 2021    TSH 7.87 (H) 2020    [id number] 7 (L) 2022    ESTGFRAFRICA 26.6 (A) 2021    ESTGFRAFRICA 25.2 (A) 2020    EGFRNONAA 23.1 (A) 2021    EGFRNONAA 21.9 (A) 2020    LABCALC 3.2 2022    [id number] 0 (L) 2022     - Daily intake of calcium: Dietary only  - Daily intake of vitamin D: None  - Taking lithium or hydrochlorothiazide: yes   - Most recent DEXA: Reports normal exam in the past 5-6 weeks    - Surgical indications: Possibly left sided renal calculi on CT scan. Impaired GFR. Serum calcium greater than 11.5  - Current symptoms: Fatigue, weakness, anorexia, N/V      Interval HPI:   Overnight events:  No acute events reported overnight  Eatin%  Nausea: No  Hypoglycemia and intervention: No  Fever: No  TPN and/or TF: No  If yes, type of TF/TPN and rate: NA    BP (!) 119/56 (BP Location: Right arm, Patient Position: Lying)   Pulse 77   Temp 97.9 °F (36.6 °C) (Oral)   Resp 16   Ht 5' 3" (1.6 m)   Wt 58.5 kg (128 lb 15.5 oz)   SpO2 96%   BMI 22.85 kg/m²     Labs Reviewed and Include    Recent Labs   Lab 22  0546   GLU 68*   CALCIUM 10.6*   ALBUMIN 2.7*   PROT 4.9*   *   K 3.6   CO2 16*      BUN 21   CREATININE 2.3*   ALKPHOS 49*   ALT 6*   AST 27   BILITOT 0.6     Lab Results   Component Value Date    WBC 4.34 2022    HGB 7.7 (L) 2022    HCT 23.2 (L) 2022    MCV 91 2022     2022     Recent Labs   Lab 22  1536   TSH 0.413     Lab Results   Component Value Date    HGBA1C 4.9 2021       Nutritional status:   Body mass index is 22.85 kg/m².  Lab Results "   Component Value Date    ALBUMIN 2.7 (L) 09/27/2022    ALBUMIN 3.0 (L) 09/26/2022    ALBUMIN 3.3 (L) 09/25/2022     No results found for: PREALBUMIN    Estimated Creatinine Clearance: 15.9 mL/min (A) (based on SCr of 2.3 mg/dL (H)).    Accu-Checks  Recent Labs     09/24/22 2033 09/24/22 2211   POCTGLUCOSE 80 74       Current Medications and/or Treatments Impacting Glycemic Control  Immunotherapy:    Immunosuppressants       None          Steroids:   Hormones (From admission, onward)      None          Pressors:    Autonomic Drugs (From admission, onward)      None          Hyperglycemia/Diabetes Medications:   Antihyperglycemics (From admission, onward)      None            ASSESSMENT and PLAN    Primary hyperparathyroidism  - Suspect primary hyperparathyroidism (with low phosphorus) with hypercalcemia worsened by HCTZ  - Corrected calcium of this morning of 11.6 from 11.9 yesterday  - Continue cincacalcet (Sensapar) 30 mg twice daily with fluids at 100 mL/hr   - Will assess response to these measures, recommend avoiding zoledronic acid (Reclast) due to renal function and  avoiding denosumab (Prolia) if possible to prevent potential hypocalcemia  - Hold HCTZ in the hospital and at discharge   - Replete vitamin-D, recommend cholecalciferol 2000 IU daily with recheck vitamin D level outpatient in 2 months (mid December)  - Encourage oral hydration, monitor for signs of volume overload    - Outpatient workup with 24 hour urine calcium to assess for FHH (familial hypocalciuric hypercalcemia) after 3 months off hydrochlorothiazide (though very low suspicion)    Intractable nausea and vomiting  - Improved. Likely secondary to hypercalcemia, plan as above    Acquired hypothyroidism  - Continue Levothyroxine 50 mcg daily  - Recheck TSH outpatient in 4 weeks (mid October) to ensure no overtreatment in this elderly patient    Vitamin D deficiency  - Plan as above      Rob Valdivia DO  [facility]  [facility], 6th Floor  [address] [place], [address]    Office: [phone]  Fax: [phone]    Disclaimer: This note has been generated using voice-recognition software. There may be typographical errors that have been missed during proof-reading.    The above history labs imaging impression and plan were discussed with attending physician who is in agreement and also took part in this patient's care.  I personally reviewed all of the patients available medications, labs, imaging, vitals, allergies, medical history.

## 2022-09-27 NOTE — PLAN OF CARE
SSC met with patient/family at bedside. Patient experience rounding completed and reviewed the following.     Do you know your discharge plan? Yes, If yes, what is the plan? Home    If you are discharging home, do you have help at home? Yes   Do you think you will need help at home at discharge? Yes    Have you discussed your needs and preferences with your SW/CM? Yes     Assigned SW/CM notified of any patient/family needs or concerns.

## 2022-09-27 NOTE — SUBJECTIVE & OBJECTIVE
Interval History: NAEON. Patient reports one episode of N/V yesterday but did successfully have increased PO intake. Patient stable for discharge today.    Review of Systems   Constitutional:  Positive for fatigue and unexpected weight change. Negative for chills and fever.   HENT:  Positive for rhinorrhea. Negative for congestion, sore throat and trouble swallowing.    Eyes:  Negative for pain and visual disturbance.   Respiratory:  Negative for cough and shortness of breath.    Cardiovascular:  Negative for chest pain.   Gastrointestinal:  Negative for abdominal pain, constipation, diarrhea, nausea and vomiting.   Endocrine: Positive for cold intolerance. Negative for polyuria.   Genitourinary:  Negative for difficulty urinating and dysuria.   Musculoskeletal:  Positive for arthralgias (chronic hip pain). Negative for myalgias.   Skin:  Negative for rash and wound.   Neurological:  Negative for dizziness, weakness and light-headedness.   Psychiatric/Behavioral:  Negative for confusion and decreased concentration.    Objective:     Vital Signs (Most Recent):  Temp: 98.4 °F (36.9 °C) (09/27/22 1109)  Pulse: 76 (09/27/22 1109)  Resp: 16 (09/27/22 1109)  BP: (!) 118/56 (09/27/22 1109)  SpO2: 97 % (09/27/22 1109)   Vital Signs (24h Range):  Temp:  [97.9 °F (36.6 °C)-98.4 °F (36.9 °C)] 98.4 °F (36.9 °C)  Pulse:  [76-83] 76  Resp:  [16-19] 16  SpO2:  [95 %-99 %] 97 %  BP: (118-123)/(56-58) 118/56     Weight: 58.5 kg (128 lb 15.5 oz)  Body mass index is 22.85 kg/m².    Intake/Output Summary (Last 24 hours) at 9/27/2022 1412  Last data filed at 9/27/2022 0600  Gross per 24 hour   Intake 500 ml   Output 1400 ml   Net -900 ml        Physical Exam  Vitals and nursing note reviewed.   Constitutional:       General: She is not in acute distress.     Appearance: Normal appearance. She is not ill-appearing or diaphoretic.   HENT:      Right Ear: External ear normal.      Left Ear: External ear normal.      Nose: Nose normal.    Eyes:      General:         Right eye: No discharge.         Left eye: No discharge.      Extraocular Movements: Extraocular movements intact.      Conjunctiva/sclera: Conjunctivae normal.   Cardiovascular:      Rate and Rhythm: Normal rate and regular rhythm.      Heart sounds: Normal heart sounds. No murmur heard.  Pulmonary:      Effort: Pulmonary effort is normal. No respiratory distress.      Breath sounds: Normal breath sounds. No wheezing or rales.   Abdominal:      General: Abdomen is flat. There is no distension.      Palpations: There is no mass.      Tenderness: There is no abdominal tenderness.   Musculoskeletal:         General: No swelling or deformity. Normal range of motion.      Cervical back: Normal range of motion and neck supple.      Right lower leg: No edema.      Left lower leg: No edema.   Skin:     General: Skin is warm.      Capillary Refill: Capillary refill takes 2 to 3 seconds.      Coloration: Skin is not jaundiced.      Findings: No bruising.   Neurological:      Mental Status: She is alert and oriented to person, place, and time.   Psychiatric:         Mood and Affect: Mood normal.         Behavior: Behavior normal.         Thought Content: Thought content normal.     Significant Labs: All pertinent labs within the past 24 hours have been reviewed.    Significant Imaging: I have reviewed all pertinent imaging results/findings within the past 24 hours.

## 2022-09-27 NOTE — ASSESSMENT & PLAN NOTE
Intractable N/V with decreased PO tolerance, N/V controlled on current PRN regiment    - Resolved   - discharge with prn zofran, QTc within normal range

## 2022-09-27 NOTE — PLAN OF CARE
Problem: Adult Inpatient Plan of Care  Goal: Plan of Care Review  Outcome: Met  Goal: Patient-Specific Goal (Individualized)  Outcome: Met  Goal: Absence of Hospital-Acquired Illness or Injury  Outcome: Met  Goal: Optimal Comfort and Wellbeing  Outcome: Met  Goal: Readiness for Transition of Care  Outcome: Met     Problem: Fluid and Electrolyte Imbalance (Acute Kidney Injury/Impairment)  Goal: Fluid and Electrolyte Balance  Outcome: Met     Problem: Oral Intake Inadequate (Acute Kidney Injury/Impairment)  Goal: Optimal Nutrition Intake  Outcome: Met     Problem: Renal Function Impairment (Acute Kidney Injury/Impairment)  Goal: Effective Renal Function  Outcome: Met     Problem: Skin Injury Risk Increased  Goal: Skin Health and Integrity  Outcome: Met   Patient education and Poc completed for kevin Cadena reviewed with kylie. All questions answered at this time.

## 2022-09-27 NOTE — ASSESSMENT & PLAN NOTE
Home medications: nebivolol 10mg nightly, HCTZ 25mg daily, amlodipine 10mg daily    - hold antihypertensives in the setting of volume depletion and initially soft blood pressures  - will hold on discharge until PCP follow up

## 2022-09-27 NOTE — DISCHARGE SUMMARY
Сергей Cordova - Telemetry [facility] Medicine  Discharge Summary      Patient Name: Elina Jerez  MRN: [id number]  Patient Class: IP- Inpatient  Admission Date: 9/24/2022  Hospital Length of Stay: 3 days  Discharge Date and Time:  09/27/2022 4:14 PM  Attending Physician: Han Frazier MD   Discharging Provider: Manisha Smith MD  Primary Care Provider: Carlos Tony MD  [facility] Medicine Team: [facility] 3 Manisha Smith MD    HPI:   82 year old female with HLD, HTN, OLGA, and Thyroid disease presented to ED with intractable nausea and vomiting. She reports a 4 week history of nausea and vomiting unrelieved by anti-emetics. She has had PO intolerance due to the N/V but does not report any association of symptoms with the type of food or beverage she consumes. Her vomit has always been non-bloody and today her vomit was bilious. She endorses difficulty having bowel movements and constipation as well as decreased urine output secondary to her decreased PO intake. She claims to have an appetite that waxes and wanes but does not have any food aversion and notes that salad tends to be a food she can keep down. She endorses lethargy, weakness, and a 16lb weight loss in past 2-3 weeks. She denies any fever, chills, diarrhea, difficulty swallowing, chest pain, SOB, blood in her stool or vomit.     Seen 9/13 for similar complaints, CT at the time not concerning for obstruction and she was sent home with zofran and compazine. Prior to that, she was in the ICU for electrolyte derangements related to similar episode.    In the ED, she was mildly hypotensive on arrival to 90's/40's with a CMP remarkable for several electrolyte abnormalities: hypercalcemia to 12.3, hypokalemia to 3.2, hyponatremia to 131, metabolic acidosis with bicarb of 16, and hypomagnesemia of 1.2. CBC was remarkable for Hb of 8.7 and elevated troponin of 0.54 and 0.72. Her BNP was elevated to 209.    She was admitted to hospital medicine  for further evaluation and management of intractable N/V with electrolyte derangements.      * No surgery found *      Hospital Course:   Patient admitted for intractable N/V, PO intolerance, and electrolyte derangements. She was placed on IVF, PRN antiemetics, electrolytes were replaced, and vitamin supplementation was provided. On 9/25, patient much improved without N/V and electrolytes trending in the right direction. Endocrinology was consulted regarding patient's hypercalcemia likely 2/2 primary vs tertiary hyperparathyroidism contributing to the N/V. Endo recommended increasing cinacalcet to 30mg BID and discontinuing HCTZ entirely, with close outpatient follow-up. On 9/27, patient medically stable for discharge to home with increased dose of cincalcet to 30mg BID, instructions to discontinue HCTZ, and close follow-up outpatient with endocrinology clinic and PCP within a week. 24 hr calcium urine collection to be done outpatient after 3 months off of HCTZ, Vit D level in December, TSH in mid October, and a renal panel this Friday to recheck Calcium.     Interval History: NAEON. Patient reports one episode of N/V yesterday but did successfully have increased PO intake. Patient stable for discharge today.    Review of Systems   Constitutional:  Positive for fatigue and unexpected weight change. Negative for chills and fever.   HENT:  Positive for rhinorrhea. Negative for congestion, sore throat and trouble swallowing.    Eyes:  Negative for pain and visual disturbance.   Respiratory:  Negative for cough and shortness of breath.    Cardiovascular:  Negative for chest pain.   Gastrointestinal:  Negative for abdominal pain, constipation, diarrhea, nausea and vomiting.   Endocrine: Positive for cold intolerance. Negative for polyuria.   Genitourinary:  Negative for difficulty urinating and dysuria.   Musculoskeletal:  Positive for arthralgias (chronic hip pain). Negative for myalgias.   Skin:  Negative for rash and  wound.   Neurological:  Negative for dizziness, weakness and light-headedness.   Psychiatric/Behavioral:  Negative for confusion and decreased concentration.    Objective:     Vital Signs (Most Recent):  Temp: 98.4 °F (36.9 °C) (09/27/22 1109)  Pulse: 76 (09/27/22 1109)  Resp: 16 (09/27/22 1109)  BP: (!) 118/56 (09/27/22 1109)  SpO2: 97 % (09/27/22 1109)   Vital Signs (24h Range):  Temp:  [97.9 °F (36.6 °C)-98.4 °F (36.9 °C)] 98.4 °F (36.9 °C)  Pulse:  [76-83] 76  Resp:  [16-19] 16  SpO2:  [95 %-99 %] 97 %  BP: (118-123)/(56-58) 118/56     Weight: 58.5 kg (128 lb 15.5 oz)  Body mass index is 22.85 kg/m².    Intake/Output Summary (Last 24 hours) at 9/27/2022 1412  Last data filed at 9/27/2022 0600  Gross per 24 hour   Intake 500 ml   Output 1400 ml   Net -900 ml        Physical Exam  Vitals and nursing note reviewed.   Constitutional:       General: She is not in acute distress.     Appearance: Normal appearance. She is not ill-appearing or diaphoretic.   HENT:      Right Ear: External ear normal.      Left Ear: External ear normal.      Nose: Nose normal.   Eyes:      General:         Right eye: No discharge.         Left eye: No discharge.      Extraocular Movements: Extraocular movements intact.      Conjunctiva/sclera: Conjunctivae normal.   Cardiovascular:      Rate and Rhythm: Normal rate and regular rhythm.      Heart sounds: Normal heart sounds. No murmur heard.  Pulmonary:      Effort: Pulmonary effort is normal. No respiratory distress.      Breath sounds: Normal breath sounds. No wheezing or rales.   Abdominal:      General: Abdomen is flat. There is no distension.      Palpations: There is no mass.      Tenderness: There is no abdominal tenderness.   Musculoskeletal:         General: No swelling or deformity. Normal range of motion.      Cervical back: Normal range of motion and neck supple.      Right lower leg: No edema.      Left lower leg: No edema.   Skin:     General: Skin is warm.      Capillary  Refill: Capillary refill takes 2 to 3 seconds.      Coloration: Skin is not jaundiced.      Findings: No bruising.   Neurological:      Mental Status: She is alert and oriented to person, place, and time.   Psychiatric:         Mood and Affect: Mood normal.         Behavior: Behavior normal.         Thought Content: Thought content normal.     Significant Labs: All pertinent labs within the past 24 hours have been reviewed.    Significant Imaging: I have reviewed all pertinent imaging results/findings within the past 24 hours.        Goals of Care Treatment Preferences:  Code Status: Full Code      Consults:   Consults (From admission, onward)          Status Ordering Provider     Inpatient consult to Endocrinology  Once        Provider:  (Not yet assigned)    Completed RAMIRO ALEXANDRE            * Electrolyte abnormality  82 year old female with HLD, HTN, OLGA, and Thyroid disease admitted for evaluation and management of month-long intractable N/V with electrolyte derangements. Patient with decreased PO tolerance, non-bloody bilious vomit, decreased urine output, lethargy, weakness, and 16lb weight loss in past 2-3 weeks. CMP remarkable for several electrolyte abnormalities: hypercalcemia, hypokalemia, hyponatremia, metabolic acidosis, and hypomagnesemia. CBC was remarkable for Hb of 8.7 and elevated troponin of 0.54 and 0.72. BNP was elevated to 209.     Hypercalcemia, likely 2/2 primary vs tertiary hyperparathyroidism, causing N/V and decreased GI motility leading to patients clinical presentation with PO intolerance and secondary electrolyte derangements.     - PTH level inappropriately elevated  - unable to order Vit D level inpatient    Plan:  - electrolytes repleted  - endocrinology consulted for hypercalcemia, f/u recs  - cincacalcet increased to 30 mg BID  - hold HCTZ in the hospital and at discharge   - Replete vitamin-D, recommend Vit D3 2000 IU once daily.  Recheck outpatient in 1-2 months.  -  Encourage oral hydration  - 24 hr calcium urine collection to be done outpatient after 3 months off of HCTZ  - Vit D level in December  - TSH in mid October  - close follow up with endocrinology   - renal panel this Friday to recheck Calcium  - daily CMP with Mag and Phos, replete electrolytes as needed      Intractable nausea and vomiting  Intractable N/V with decreased PO tolerance, N/V controlled on current PRN regiment    - Resolved   - discharge with prn zofran, QTc within normal range      Benign essential HTN  Home medications: nebivolol 10mg nightly, HCTZ 25mg daily, amlodipine 10mg daily    - hold antihypertensives in the setting of volume depletion and initially soft blood pressures  - will hold on discharge until PCP follow up      Vitamin D deficiency  - Continue Vitamin D 2000 units QD         Final Active Diagnoses:    Diagnosis Date Noted POA    PRINCIPAL PROBLEM:  Electrolyte abnormality [E87.8] 09/24/2022 Unknown    Intractable nausea and vomiting [R11.2] 09/24/2022 Yes    KHOI (iron deficiency anemia) [D50.9] 09/02/2022 Yes    Constipation [K59.00] 09/24/2022 Unknown    Benign essential HTN [I10] 08/27/2022 Yes    Acquired hypothyroidism [E03.9] 09/25/2022 Yes    Vitamin D deficiency [E55.9] 09/25/2022 Yes    Hypokalemia [E87.6] 09/24/2022 Unknown    Hyponatremia [E87.1] 09/24/2022 Unknown    Hypomagnesemia [E83.42] 09/24/2022 Unknown    Primary hyperparathyroidism [E21.0] 08/27/2022 Yes      Problems Resolved During this Admission:       Discharged Condition: fair    Disposition:     Follow Up:   Follow-up Information       Carlos Tony MD Follow up on 10/4/2022.    Specialty: Nephrology  Why: Established 's [facility] f/u visit @ 11:00am. Please bring discharge summary, ID, insurance card, and medication list.  Contact information:  [address] [facility] [address] [phone]                           Patient Instructions:      WALKER FOR HOME USE     Order Specific Question Answer  "Comments   Type of Walker: Isrrael (4'4"-5'6")    With wheels? Yes    Height: 5' 3" (1.6 m)    Weight: 58.5 kg (128 lb 15.5 oz)    Length of need (1-99 months): 99    Does patient have medical equipment at home? walker, rolling    Does patient have medical equipment at home? shower chair    Please check all that apply: Patient is unable to safely ambulate without equipment.      BASIC METABOLIC PANEL   Standing Status: Future Standing Exp. Date: 11/26/23       Significant Diagnostic Studies: Labs:   CMP   Recent Labs   Lab 09/26/22 0311 09/27/22  0546   * 133*   K 3.8 3.6    109   CO2 18* 16*   GLU 77 68*   BUN 26* 21   CREATININE 2.8* 2.3*   CALCIUM 11.1* 10.6*   PROT 5.3* 4.9*   ALBUMIN 3.0* 2.7*   BILITOT 0.6 0.6   ALKPHOS 50* 49*   AST 32 27   ALT 8* 6*   ANIONGAP 8 8       Pending Diagnostic Studies:       Procedure Component Value Units Date/Time    EKG 12-lead [[id number]]     Order Status: Sent Lab Status: No result     Urinalysis, Reflex to Urine Culture Urine, Clean Catch [[id number]] Collected: 09/25/22 0545    Order Status: Sent Lab Status: In process Updated: 09/25/22 0615    Specimen: Urine            Medications:  Reconciled Home Medications:      Medication List        START taking these medications      cholecalciferol (vitamin D3) 25 mcg (1,000 unit) capsule  Commonly known as: VITAMIN D3  Take 2 capsules (2,000 Units total) by mouth once daily.            CHANGE how you take these medications      amLODIPine 10 MG tablet  Commonly known as: NORVASC  Take 1 tablet (10 mg total) by mouth once daily. Please HOLD until follow up with PCP  What changed: additional instructions     cinacalcet 30 MG Tab  Commonly known as: SENSIPAR  Take 1 tablet (30 mg total) by mouth 2 (two) times a day.  What changed: when to take this     ferrous sulfate 325 (65 FE) MG EC tablet  Take 1 tablet (325 mg total) by mouth once daily.  What changed: when to take this     nebivoloL 10 MG Tab  Commonly known as: " BYSTOLIC  Take 1 tablet (10 mg total) by mouth once daily. HOLD Until PCP follow up  What changed:   when to take this  additional instructions     ondansetron 4 MG Tbdl  Commonly known as: ZOFRAN-ODT  Dissolve 1 tablet (4 mg total) by mouth every 8 (eight) hours as needed.  What changed: when to take this            CONTINUE taking these medications      acetaminophen-codeine 300-30mg 300-30 mg Tab  Commonly known as: TYLENOL #3  Take 1 tablet by mouth every 8 (eight) hours as needed (pain).     aliskiren 300 MG Tab  Commonly known as: TEKTURNA  Take by mouth once daily.     allopurinoL 300 MG tablet  Commonly known as: ZYLOPRIM  Take 300 mg by mouth once daily.     atorvastatin 20 MG tablet  Commonly known as: LIPITOR  Take 20 mg by mouth once daily.     famotidine 20 MG tablet  Commonly known as: PEPCID  Take 1 tablet (20 mg total) by mouth Daily.     hydrOXYchloroQUINE 200 mg tablet  Commonly known as: PLAQUENIL  Take by mouth once daily.     levothyroxine 75 MCG tablet  Commonly known as: SYNTHROID  Take 75 mcg by mouth once daily.            STOP taking these medications      hydroCHLOROthiazide 25 MG tablet  Commonly known as: HYDRODIURIL     prochlorperazine 10 MG tablet  Commonly known as: COMPAZINE              Indwelling Lines/Drains at time of discharge:   Lines/Drains/Airways       None                   Time spent on the discharge of patient: 60 minutes         Manisha Smith MD  Department of Hospital Medicine  Сергей roman - Telemetry Stepdown

## 2022-09-27 NOTE — PLAN OF CARE
Problem: Adult Inpatient Plan of Care  Goal: Absence of Hospital-Acquired Illness or Injury  Intervention: Identify and Manage Fall Risk  Flowsheets (Taken 9/27/2022 0226)  Safety Promotion/Fall Prevention:   assistive device/personal item within reach   side rails raised x 2   bed alarm set  Intervention: Prevent Skin Injury  Flowsheets (Taken 9/27/2022 0226)  Body Position: position changed independently  Skin Protection: adhesive use limited  Intervention: Prevent and Manage VTE (Venous Thromboembolism) Risk  Flowsheets (Taken 9/27/2022 0226)  Activity Management: Rolling - L1  Intervention: Prevent Infection  Flowsheets (Taken 9/27/2022 0226)  Infection Prevention:   environmental surveillance performed   rest/sleep promoted  Goal: Optimal Comfort and Wellbeing  Intervention: Provide Person-Centered Care  Flowsheets (Taken 9/27/2022 0226)  Trust Relationship/Rapport:   care explained   emotional support provided   questions encouraged   thoughts/feelings acknowledged   Pt in bed, resp even and unlabored.  No s/s of distress noted at this time.  Safety measures in progress. The call light is within reach.

## 2022-09-27 NOTE — ASSESSMENT & PLAN NOTE
82 year old female with HLD, HTN, OLGA, and Thyroid disease admitted for evaluation and management of month-long intractable N/V with electrolyte derangements. Patient with decreased PO tolerance, non-bloody bilious vomit, decreased urine output, lethargy, weakness, and 16lb weight loss in past 2-3 weeks. CMP remarkable for several electrolyte abnormalities: hypercalcemia, hypokalemia, hyponatremia, metabolic acidosis, and hypomagnesemia. CBC was remarkable for Hb of 8.7 and elevated troponin of 0.54 and 0.72. BNP was elevated to 209.     Hypercalcemia, likely 2/2 primary vs tertiary hyperparathyroidism, causing N/V and decreased GI motility leading to patients clinical presentation with PO intolerance and secondary electrolyte derangements.     - PTH level inappropriately elevated  - unable to order Vit D level inpatient    Plan:  - electrolytes repleted  - endocrinology consulted for hypercalcemia, f/u recs  - cincacalcet increased to 30 mg BID  - hold HCTZ in the hospital and at discharge   - Replete vitamin-D, recommend Vit D3 2000 IU once daily.  Recheck outpatient in 1-2 months.  - Encourage oral hydration  - 24 hr calcium urine collection to be done outpatient after 3 months off of HCTZ  - Vit D level in December  - TSH in mid October  - close follow up with endocrinology   - renal panel this Friday to recheck Calcium  - daily CMP with Mag and Phos, replete electrolytes as needed

## 2022-09-27 NOTE — SUBJECTIVE & OBJECTIVE
"Interval HPI:   Overnight events:  No acute events reported overnight  Eatin%  Nausea: No  Hypoglycemia and intervention: No  Fever: No  TPN and/or TF: No  If yes, type of TF/TPN and rate: NA    BP (!) 119/56 (BP Location: Right arm, Patient Position: Lying)   Pulse 77   Temp 97.9 °F (36.6 °C) (Oral)   Resp 16   Ht 5' 3" (1.6 m)   Wt 58.5 kg (128 lb 15.5 oz)   SpO2 96%   BMI 22.85 kg/m²     Labs Reviewed and Include    Recent Labs   Lab 22  0546   GLU 68*   CALCIUM 10.6*   ALBUMIN 2.7*   PROT 4.9*   *   K 3.6   CO2 16*      BUN 21   CREATININE 2.3*   ALKPHOS 49*   ALT 6*   AST 27   BILITOT 0.6     Lab Results   Component Value Date    WBC 4.34 2022    HGB 7.7 (L) 2022    HCT 23.2 (L) 2022    MCV 91 2022     2022     Recent Labs   Lab 22  1536   TSH 0.413     Lab Results   Component Value Date    HGBA1C 4.9 2021       Nutritional status:   Body mass index is 22.85 kg/m².  Lab Results   Component Value Date    ALBUMIN 2.7 (L) 2022    ALBUMIN 3.0 (L) 2022    ALBUMIN 3.3 (L) 2022     No results found for: PREALBUMIN    Estimated Creatinine Clearance: 15.9 mL/min (A) (based on SCr of 2.3 mg/dL (H)).    Accu-Checks  Recent Labs     221   POCTGLUCOSE 80 74       Current Medications and/or Treatments Impacting Glycemic Control  Immunotherapy:    Immunosuppressants       None          Steroids:   Hormones (From admission, onward)      None          Pressors:    Autonomic Drugs (From admission, onward)      None          Hyperglycemia/Diabetes Medications:   Antihyperglycemics (From admission, onward)      None          "

## 2022-10-11 PROBLEM — R11.2 INTRACTABLE NAUSEA AND VOMITING: Status: RESOLVED | Noted: 2022-09-24 | Resolved: 2022-10-11

## 2022-10-11 NOTE — PROGRESS NOTES
Subjective:      Chief Complaint:  Hyperparathyroidism      History of Present Illness  82 y.o. female with hypothyroidism, hyperlipidemia, CKD IV, rheumatoid arthritis, hypertension, and OLGA who presents for follow-up after recent hospitalization from 09/24/2022 through 09/27/2022 for significant hypercalcemia    - She was seen recently on 09/13/2022 in the ED for similar complaints for which she was placed on antiemetics after unremarkable CT scan. Prior to the end of August she was in the ICU for electrolyte abnormalities including hypercalcemia but further workup was not done at that time due to the fact the patient was on Tums multiple times a day for GERD    - Occupation: Not asked    Regarding Hypercalcemia:  - From primary versus tertiary hyperparathyroidism; suspect tertiary given presence of significant CKD and significant elevation in PTH  - She follows with a PCP/nephrologist, Dr. Carlos Tony and she was on hydrochlorothiazide for hypertension and placed on Sensipar just before last admission but had only taken 2 doses before prior hospitalization. She denies history of osteoporosis, hypercalciuria or any calcium supplementation. She no longer is on Tums for her reflux. She has been told that prior imaging showed nephrolithiasis but these are asymptomatic for her.  - Notable for elevated calcium levels since 2021  - Stopped the HCTZ and halved the Bistolic in the hospital  -   Lab Results   Component Value Date    .3 (H) 09/25/2022    .1 (H) 08/28/2022    .0 (H) 08/27/2022    CALCIUM 10.6 (H) 09/27/2022    CALCIUM 11.1 (H) 09/26/2022    CALCIUM 11.8 (H) 09/25/2022    ALBUMIN 2.7 (L) 09/27/2022    ALBUMIN 3.0 (L) 09/26/2022    ALBUMIN 3.3 (L) 09/25/2022    PHOS 2.4 (L) 09/27/2022    PHOS 2.7 09/26/2022    PHOS 1.7 (L) 09/25/2022    ALKPHOS 49 (L) 09/27/2022    ALKPHOS 50 (L) 09/26/2022    ALKPHOS 50 (L) 09/25/2022    TSH 0.413 09/24/2022    TSH 6.31 (H) 03/30/2021    TSH 7.87 (H)  "02/03/2020    [id number] 7 (L) 08/29/2022    ESTGFRAFRICA 26.6 (A) 03/30/2021    ESTGFRAFRICA 25.2 (A) 02/03/2020    EGFRNONAA 23.1 (A) 03/30/2021    EGFRNONAA 21.9 (A) 02/03/2020    LABCALC 3.2 08/29/2022    [id number] 0 (L) 08/29/2022     - Daily intake of calcium: Dietary only  - Daily intake of vitamin D: None  - Taking lithium or hydrochlorothiazide: yes   - Most recent DEXA: Reports normal exam in the past 5-6 weeks     - Surgical indications: Possibly left sided renal calculi on CT scan. Impaired GFR. Serum calcium greater than 11.5  - Current symptoms: Fatigue, weakness    - I independently reviewed all pertinent outside records and imaging    Objective:   BP (!) 150/80 (BP Location: Right arm, Patient Position: Sitting, BP Method: Small (Manual))   Pulse 91   Resp 18   Ht 5' 3" (1.6 m)   Wt 58.1 kg (128 lb)   SpO2 99%   BMI 22.67 kg/m²   Physical Exam  Constitutional:       Appearance: Normal appearance.   Musculoskeletal:      Cervical back: Neck supple. No rigidity or tenderness.   Lymphadenopathy:      Cervical: No cervical adenopathy.   Neurological:      General: No focal deficit present.      Mental Status: She is alert and oriented to person, place, and time.   Psychiatric:         Mood and Affect: Mood normal.         Behavior: Behavior normal.     BP Readings from Last 1 Encounters:   10/12/22 (!) 150/80      Wt Readings from Last 1 Encounters:   10/12/22 0823 58.1 kg (128 lb)     Body mass index is 22.67 kg/m².    Lab Review:   Lab Results   Component Value Date    HGBA1C 4.9 03/30/2021     Lab Results   Component Value Date    CHOL 154 03/30/2021    HDL 82 (H) 03/30/2021    LDLCALC 62 03/30/2021    TRIG 52 03/30/2021    CHOLHDL 53.2 (H) 03/30/2021     Lab Results   Component Value Date     (L) 09/27/2022    K 3.6 09/27/2022     09/27/2022    CO2 16 (L) 09/27/2022    GLU 68 (L) 09/27/2022    BUN 21 09/27/2022    CREATININE 2.3 (H) 09/27/2022    CALCIUM 10.6 (H) 09/27/2022    " PROT 4.9 (L) 09/27/2022    ALBUMIN 2.7 (L) 09/27/2022    BILITOT 0.6 09/27/2022    ALKPHOS 49 (L) 09/27/2022    AST 27 09/27/2022    ALT 6 (L) 09/27/2022    ANIONGAP 8 09/27/2022    ESTGFRAFRICA 26.6 (A) 03/30/2021    EGFRNONAA 23.1 (A) 03/30/2021    TSH 0.413 09/24/2022       All pertinent labs reviewed    Assessment and Plan     Primary hyperparathyroidism  - High suspicion for primary hyperparathyroidism    - Continue adequate hydration  - Assess 24 hour urine calcium collection for degree of hypercalciuria  - Check thyroid ultrasound and thyroid scan to assess for parathyroid adenoma with Endocrine Surgery appointment afterward    - Patient asked to send record from DEXA scan obtained outside network a few weeks/months prior to recent hospitalization    Acquired hypothyroidism  - Patient asked to have Dr. Tony obtain TSH with his next labs anticipated on 10/20/2022    Vitamin D deficiency  - Continue supplementation with cholecalciferol 2000 IU twice daily      Rob Valdivia DO  [facility], 6th Floor  [address] [place], [place], [address]    Office: [phone]  Fax: [phone]    Disclaimer: This note has been generated using voice-recognition software. There may be typographical errors that have been missed during proof-reading.    The above history labs imaging impression and plan were discussed with attending physician who is in agreement and also took part in this patient's care.  I personally reviewed all of the patients available medications, labs, imaging, vitals, allergies, medical history

## 2022-10-11 NOTE — ASSESSMENT & PLAN NOTE
- High suspicion for primary hyperparathyroidism    - Continue adequate hydration  - Assess 24 hour urine calcium collection for degree of hypercalciuria  - Check thyroid ultrasound and thyroid scan to assess for parathyroid adenoma with Endocrine Surgery appointment afterward    - Patient asked to send record from DEXA scan obtained outside network a few weeks/months prior to recent hospitalization

## 2022-10-12 ENCOUNTER — OFFICE VISIT (OUTPATIENT)
Dept: ENDOCRINOLOGY | Facility: CLINIC | Age: 82
End: 2022-10-12
Payer: MEDICARE

## 2022-10-12 VITALS
SYSTOLIC BLOOD PRESSURE: 150 MMHG | DIASTOLIC BLOOD PRESSURE: 80 MMHG | RESPIRATION RATE: 18 BRPM | HEART RATE: 91 BPM | WEIGHT: 128 LBS | OXYGEN SATURATION: 99 % | BODY MASS INDEX: 22.68 KG/M2 | HEIGHT: 63 IN

## 2022-10-12 DIAGNOSIS — E21.0 PRIMARY HYPERPARATHYROIDISM: ICD-10-CM

## 2022-10-12 DIAGNOSIS — E03.9 ACQUIRED HYPOTHYROIDISM: Primary | ICD-10-CM

## 2022-10-12 DIAGNOSIS — E55.9 VITAMIN D DEFICIENCY: ICD-10-CM

## 2022-10-12 PROCEDURE — 1160F PR REVIEW ALL MEDS BY PRESCRIBER/CLIN PHARMACIST DOCUMENTED: ICD-10-PCS | Mod: CPTII,S$GLB,, | Performed by: INTERNAL MEDICINE

## 2022-10-12 PROCEDURE — 1100F PTFALLS ASSESS-DOCD GE2>/YR: CPT | Mod: CPTII,S$GLB,, | Performed by: INTERNAL MEDICINE

## 2022-10-12 PROCEDURE — 99999 PR PBB SHADOW E&M-EST. PATIENT-LVL V: ICD-10-PCS | Mod: PBBFAC,,,

## 2022-10-12 PROCEDURE — 1126F AMNT PAIN NOTED NONE PRSNT: CPT | Mod: CPTII,S$GLB,, | Performed by: INTERNAL MEDICINE

## 2022-10-12 PROCEDURE — 1160F RVW MEDS BY RX/DR IN RCRD: CPT | Mod: CPTII,S$GLB,, | Performed by: INTERNAL MEDICINE

## 2022-10-12 PROCEDURE — 1100F PR PT FALLS ASSESS DOC 2+ FALLS/FALL W/INJURY/YR: ICD-10-PCS | Mod: CPTII,S$GLB,, | Performed by: INTERNAL MEDICINE

## 2022-10-12 PROCEDURE — 3079F DIAST BP 80-89 MM HG: CPT | Mod: CPTII,S$GLB,, | Performed by: INTERNAL MEDICINE

## 2022-10-12 PROCEDURE — 3288F PR FALLS RISK ASSESSMENT DOCUMENTED: ICD-10-PCS | Mod: CPTII,S$GLB,, | Performed by: INTERNAL MEDICINE

## 2022-10-12 PROCEDURE — 3077F PR MOST RECENT SYSTOLIC BLOOD PRESSURE >= 140 MM HG: ICD-10-PCS | Mod: CPTII,S$GLB,, | Performed by: INTERNAL MEDICINE

## 2022-10-12 PROCEDURE — 3077F SYST BP >= 140 MM HG: CPT | Mod: CPTII,S$GLB,, | Performed by: INTERNAL MEDICINE

## 2022-10-12 PROCEDURE — 99214 PR OFFICE/OUTPT VISIT, EST, LEVL IV, 30-39 MIN: ICD-10-PCS | Mod: GC,S$GLB,, | Performed by: INTERNAL MEDICINE

## 2022-10-12 PROCEDURE — 1111F PR DISCHARGE MEDS RECONCILED W/ CURRENT OUTPATIENT MED LIST: ICD-10-PCS | Mod: CPTII,S$GLB,, | Performed by: INTERNAL MEDICINE

## 2022-10-12 PROCEDURE — 1126F PR PAIN SEVERITY QUANTIFIED, NO PAIN PRESENT: ICD-10-PCS | Mod: CPTII,S$GLB,, | Performed by: INTERNAL MEDICINE

## 2022-10-12 PROCEDURE — 1111F DSCHRG MED/CURRENT MED MERGE: CPT | Mod: CPTII,S$GLB,, | Performed by: INTERNAL MEDICINE

## 2022-10-12 PROCEDURE — 99999 PR PBB SHADOW E&M-EST. PATIENT-LVL V: CPT | Mod: PBBFAC,,,

## 2022-10-12 PROCEDURE — 1159F PR MEDICATION LIST DOCUMENTED IN MEDICAL RECORD: ICD-10-PCS | Mod: CPTII,S$GLB,, | Performed by: INTERNAL MEDICINE

## 2022-10-12 PROCEDURE — 3079F PR MOST RECENT DIASTOLIC BLOOD PRESSURE 80-89 MM HG: ICD-10-PCS | Mod: CPTII,S$GLB,, | Performed by: INTERNAL MEDICINE

## 2022-10-12 PROCEDURE — 3288F FALL RISK ASSESSMENT DOCD: CPT | Mod: CPTII,S$GLB,, | Performed by: INTERNAL MEDICINE

## 2022-10-12 PROCEDURE — 99214 OFFICE O/P EST MOD 30 MIN: CPT | Mod: GC,S$GLB,, | Performed by: INTERNAL MEDICINE

## 2022-10-12 PROCEDURE — 1159F MED LIST DOCD IN RCRD: CPT | Mod: CPTII,S$GLB,, | Performed by: INTERNAL MEDICINE

## 2022-10-12 NOTE — PATIENT INSTRUCTIONS
- Please ask Dr. Tony to send results from his blood draw on 10/20 and check a TSH (thyroid stimulating hormone) with his labs  - Will check a 24 hour urine collection for calcium level now (no need to wait 3 months after hydrochlorothiazide)  - Remember to stay well hydrated and take Sensapar as directed  - Continue taking vitamin D supplementation at home    - Thyroid ultrasound and parathyroid scan to attempt to localize the responsible parathyroid gland in preparation for surgical treatment  - Afterward, will set up Endocrine Surgery appointment with Dr. Gregorio      HOW TO COLLECT AN ACCURATE   24 HOUR URINE SPECIMEN     - The first urine of the day upon waking when you start the 24 hour collection should be flushed down the toilet. Afterwards, collect EVERY drop of your urine for a 24 hour period.  The final total volume is not important as long as it consists of every drop that you produce during the 24 hour period   - After collection, bring the closed container back to the same lab

## 2022-10-26 ENCOUNTER — TELEPHONE (OUTPATIENT)
Dept: ENDOCRINOLOGY | Facility: HOSPITAL | Age: 82
End: 2022-10-26
Payer: MEDICARE

## 2022-10-26 DIAGNOSIS — E21.0 PRIMARY HYPERPARATHYROIDISM: Primary | ICD-10-CM

## 2022-10-26 NOTE — TELEPHONE ENCOUNTER
Spoke with patient on the telephone with regards to needing repeat urine collection for 24 hour calcium.  She wishes to arrange container  at [facility] if possible

## 2022-11-01 ENCOUNTER — HOSPITAL ENCOUNTER (OUTPATIENT)
Dept: RADIOLOGY | Facility: HOSPITAL | Age: 82
Discharge: HOME | End: 2022-11-01
Attending: STUDENT IN AN ORGANIZED HEALTH CARE EDUCATION/TRAINING PROGRAM
Payer: MEDICARE

## 2022-11-01 DIAGNOSIS — E21.0 PRIMARY HYPERPARATHYROIDISM: ICD-10-CM

## 2022-11-01 PROCEDURE — 78072 PARATHYRD PLANAR W/SPECT&CT: CPT | Mod: TC

## 2022-11-01 PROCEDURE — 78072 PARATHYRD PLANAR W/SPECT&CT: CPT | Mod: 26,,, | Performed by: RADIOLOGY

## 2022-11-01 PROCEDURE — 78072 NM PARATHYROID SCAN WITH SPECT AND CT: ICD-10-PCS | Mod: 26,,, | Performed by: RADIOLOGY

## 2022-11-02 NOTE — PROGRESS NOTES
Parathyroid uptake scan did not show adenoma.  Awaiting thyroid ultrasound (sent message to staff to have scheduled as soon as possible).  Has upcoming appointment with Endocrine surgery.

## 2022-11-09 ENCOUNTER — TELEPHONE (OUTPATIENT)
Dept: SURGERY | Facility: CLINIC | Age: 82
End: 2022-11-09
Payer: MEDICARE

## 2022-11-09 NOTE — TELEPHONE ENCOUNTER
Pt states her daughter's car is broken and unable to attend visit tomorrow. Wished to postpone visit till Jan 1/26 after Dr. Valdivia. Notified Dr. Cross/Dr. Gregorio.

## 2022-12-05 PROBLEM — N17.9 ACUTE-ON-CHRONIC KIDNEY INJURY: Status: RESOLVED | Noted: 2022-08-27 | Resolved: 2022-12-05

## 2022-12-05 PROBLEM — N18.9 ACUTE-ON-CHRONIC KIDNEY INJURY: Status: RESOLVED | Noted: 2022-08-27 | Resolved: 2022-12-05

## 2023-01-25 ENCOUNTER — TELEPHONE (OUTPATIENT)
Dept: SURGERY | Facility: CLINIC | Age: 83
End: 2023-01-25
Payer: MEDICARE

## 2023-01-25 DIAGNOSIS — E21.0 PRIMARY HYPERPARATHYROIDISM: Primary | ICD-10-CM

## 2023-01-25 NOTE — ASSESSMENT & PLAN NOTE
- With renal calculi, impaired GFR, and prior serum calcium greater than 11.5.    - Labs today to check vitamin D, TSH, renal panel (for calcium), and PTH  - Increase vitamin D dose from 1000 IU (1 pill) up to 2000 IU (2 pills) daily  - Have thyroid ultrasound and CT done next Tuesday and follow up with Dr. Gregorio of Endocrine Surgery  - Remain well-hydrated (ideally about 3 L water intake a day) to help keep calcium down    - Return to clinic in 3 months or after parathyroidectomy

## 2023-01-25 NOTE — TELEPHONE ENCOUNTER
Spoke with pt's daughter and pt to confirm appt with Dr. Gregorio tomorrow. Pt also has appt with Dr. Valdivia at 9am- Explained to pt, Dr. Gregorio will be at [facility] in morning , appt is not till 1:30pm. Pt did not want to wait and rescheduled appt for 2/27 2pm, which gave her some time to obtain required imaging.     Scheduled 4 D cT and Thyroid Ultrasound Tuesday 1/31 1pm and 2pm. Instructed to arrive 30 min early for prep and fast 4 hrs prior to CT. Daughter verbalized understanding. Sent daughter link to setup [facility] appt and walked her how to setup portal. Appt reminders sent via mail.

## 2023-01-25 NOTE — PROGRESS NOTES
Subjective:      Chief Complaint:  Primary hyperparathyroidism      History of Present Illness  83 y.o. female with hypothyroidism, hyperlipidemia, CKD IV, rheumatoid arthritis, hypertension, and OLGA who presents for follow-up for primary hyperparathyroidism.    - Occupation: Not asked    - Interval history: Since her last visit with me on 10/12/2022, patient has undergone 24 hour urine collection that appears to have been conducted improperly and reordered 24 hour urine has not yet been performed by patient (she does not produce much urine due to CKD IV. She also had parathyroid uptake scan that did not show adenoma. Thyroid ultrasound and 4D-CT had been ordered but has not yet been performed (scheduled for Tuesday). Had a gout exacerbation and recently completed a week of steroids. Was only taking 1000 IU of vitamin-D daily rather than 2000 IU.    Regarding Hypercalcemia:  - She denies history of osteoporosis, hypercalciuria or any calcium supplementation. She no longer is on Tums for her reflux. She has been told that prior imaging showed nephrolithiasis but these are asymptomatic for her.  - Notable for elevated calcium levels since 2021  - Patient was started on cinacalcet (Sensipar) 30 mg twice daily on 09/25/2022    Lab Results   Component Value Date    .3 (H) 09/25/2022    .1 (H) 08/28/2022    .0 (H) 08/27/2022    CALCIUM 10.6 (H) 09/27/2022    CALCIUM 11.1 (H) 09/26/2022    CALCIUM 11.8 (H) 09/25/2022    ALBUMIN 2.7 (L) 09/27/2022    ALBUMIN 3.0 (L) 09/26/2022    ALBUMIN 3.3 (L) 09/25/2022    PHOS 2.4 (L) 09/27/2022    PHOS 2.7 09/26/2022    PHOS 1.7 (L) 09/25/2022    ALKPHOS 49 (L) 09/27/2022    ALKPHOS 50 (L) 09/26/2022    ALKPHOS 50 (L) 09/25/2022    TSH 0.413 09/24/2022    TSH 6.31 (H) 03/30/2021    TSH 7.87 (H) 02/03/2020    [id number] 7 (L) 08/29/2022    ESTGFRAFRICA 26.6 (A) 03/30/2021    ESTGFRAFRICA 25.2 (A) 02/03/2020    EGFRNONAA 23.1 (A) 03/30/2021    EGFRNONAA 21.9 (A)  02/03/2020    LABCALC 3.2 08/29/2022    [id number] 0 (L) 08/29/2022     - Daily intake of calcium: Dietary only  - Daily intake of vitamin D: None  - Taking lithium or hydrochlorothiazide: No   - Most recent DEXA: None in system, she reported normal exam in roughly September     - Surgical indications: Possibly left sided renal calculi on CT scan. Impaired GFR. Serum calcium greater than 11.5  - Current symptoms: Fatigue, weakness    Regarding Hypothyroidism:    - Current relevant medications: levothyroxine T4 (Synthroid) 75 mcg daily  - Weight based dosing ([58 kg] x 1.6): 92 mcg  - Takes thyroid medications properly    Lab Results   Component Value Date    TSH 0.413 09/24/2022    TSH 6.31 (H) 03/30/2021    TSH 7.87 (H) 02/03/2020    FREET4 1.07 03/30/2021    FREET4 0.95 02/03/2020    FREET4 0.92 04/17/2019     (L) 09/27/2022     (L) 09/26/2022    GLU 68 (L) 09/27/2022    GLU 77 09/26/2022     - Most recent thyroid imaging: NA (ordered)    - Most recent DEXA: NA    - I independently reviewed all pertinent outside records and imaging    Objective:   /64   Pulse 84   Resp 16   Wt 58 kg (127 lb 13.9 oz)   SpO2 98%   BMI 22.65 kg/m²   Physical Exam  Constitutional:       Appearance: Normal appearance.   Neurological:      General: No focal deficit present.      Mental Status: She is alert and oriented to person, place, and time.   Psychiatric:         Mood and Affect: Mood normal.         Behavior: Behavior normal.     BP Readings from Last 1 Encounters:   01/26/23 124/64      Wt Readings from Last 1 Encounters:   01/26/23 1105 58 kg (127 lb 13.9 oz)     Body mass index is 22.65 kg/m².    Lab Review:   Lab Results   Component Value Date    HGBA1C 4.9 03/30/2021     Lab Results   Component Value Date    CHOL 154 03/30/2021    HDL 82 (H) 03/30/2021    LDLCALC 62 03/30/2021    TRIG 52 03/30/2021    CHOLHDL 53.2 (H) 03/30/2021     Lab Results   Component Value Date     (L) 09/27/2022    K 3.6  09/27/2022     09/27/2022    CO2 16 (L) 09/27/2022    GLU 68 (L) 09/27/2022    BUN 21 09/27/2022    CREATININE 2.3 (H) 09/27/2022    CALCIUM 10.6 (H) 09/27/2022    PROT 4.9 (L) 09/27/2022    ALBUMIN 2.7 (L) 09/27/2022    BILITOT 0.6 09/27/2022    ALKPHOS 49 (L) 09/27/2022    AST 27 09/27/2022    ALT 6 (L) 09/27/2022    ANIONGAP 8 09/27/2022    ESTGFRAFRICA 26.6 (A) 03/30/2021    EGFRNONAA 23.1 (A) 03/30/2021    TSH 0.413 09/24/2022       All pertinent labs reviewed    Assessment and Plan     Primary hyperparathyroidism  - With renal calculi, impaired GFR, and prior serum calcium greater than 11.5  - Patient has been on on cinacalcet (Sensipar) 30 mg twice daily since 09/25/2022    - Continue cinacalcet (Sensipar) 30 mg twice daily  - Labs today to check vitamin D, TSH, renal panel (for calcium), and PTH  - Increase vitamin D dose from 1000 IU (1 pill) up to 2000 IU (2 pills) daily  - Have thyroid ultrasound and CT done next Tuesday and follow up with Dr. Gregorio of Endocrine Surgery  - Remain well-hydrated (ideally about 3 L water intake a day) to help keep calcium down    - Return to clinic in 3 months or after parathyroidectomy    Acquired hypothyroidism  - Continue levothyroxine 75 mcg daily  - Update TSH and adjust dosage if appropriate    Vitamin D deficiency  - Increase dose up to 2000 IU and vitamin-D level as mentioned above      Rob Valdivia DO  [facility], 6th Floor  [address] [place], [address]    Office: [phone]  Fax: [phone]    Disclaimer: This note has been generated using voice-recognition software. There may be typographical errors that have been missed during proof-reading.    The above history labs imaging impression and plan were discussed with attending physician who is in agreement and also took part in this patient's care.  I personally reviewed all of the patients available medications, labs, imaging, vitals, allergies,  medical history

## 2023-01-26 ENCOUNTER — LAB VISIT (OUTPATIENT)
Dept: LAB | Facility: HOSPITAL | Age: 83
End: 2023-01-26
Payer: MEDICARE

## 2023-01-26 ENCOUNTER — TELEPHONE (OUTPATIENT)
Dept: ENDOCRINOLOGY | Facility: CLINIC | Age: 83
End: 2023-01-26

## 2023-01-26 ENCOUNTER — OFFICE VISIT (OUTPATIENT)
Dept: ENDOCRINOLOGY | Facility: CLINIC | Age: 83
End: 2023-01-26
Payer: MEDICARE

## 2023-01-26 VITALS
HEART RATE: 84 BPM | DIASTOLIC BLOOD PRESSURE: 64 MMHG | OXYGEN SATURATION: 98 % | BODY MASS INDEX: 22.65 KG/M2 | SYSTOLIC BLOOD PRESSURE: 124 MMHG | RESPIRATION RATE: 16 BRPM | WEIGHT: 127.88 LBS

## 2023-01-26 DIAGNOSIS — E03.9 ACQUIRED HYPOTHYROIDISM: ICD-10-CM

## 2023-01-26 DIAGNOSIS — E55.9 VITAMIN D DEFICIENCY: ICD-10-CM

## 2023-01-26 DIAGNOSIS — E03.9 ACQUIRED HYPOTHYROIDISM: Primary | ICD-10-CM

## 2023-01-26 DIAGNOSIS — E21.0 PRIMARY HYPERPARATHYROIDISM: ICD-10-CM

## 2023-01-26 LAB
25(OH)D3+25(OH)D2 SERPL-MCNC: 39 NG/ML (ref 30–96)
ALBUMIN SERPL BCP-MCNC: 3.3 G/DL (ref 3.5–5.2)
ANION GAP SERPL CALC-SCNC: 16 MMOL/L (ref 8–16)
BUN SERPL-MCNC: 33 MG/DL (ref 8–23)
CALCIUM SERPL-MCNC: 8.2 MG/DL (ref 8.7–10.5)
CHLORIDE SERPL-SCNC: 107 MMOL/L (ref 95–110)
CO2 SERPL-SCNC: 18 MMOL/L (ref 23–29)
CREAT SERPL-MCNC: 2.4 MG/DL (ref 0.5–1.4)
CREAT SERPL-MCNC: 2.4 MG/DL (ref 0.5–1.4)
EST. GFR  (NO RACE VARIABLE): 19.7 ML/MIN/1.73 M^2
EST. GFR  (NO RACE VARIABLE): 19.7 ML/MIN/1.73 M^2
GLUCOSE SERPL-MCNC: 80 MG/DL (ref 70–110)
PHOSPHATE SERPL-MCNC: 3.6 MG/DL (ref 2.7–4.5)
POTASSIUM SERPL-SCNC: 3.4 MMOL/L (ref 3.5–5.1)
PTH-INTACT SERPL-MCNC: 228.8 PG/ML (ref 9–77)
SODIUM SERPL-SCNC: 141 MMOL/L (ref 136–145)
TSH SERPL DL<=0.005 MIU/L-ACNC: 0.68 UIU/ML (ref 0.4–4)

## 2023-01-26 PROCEDURE — 1159F PR MEDICATION LIST DOCUMENTED IN MEDICAL RECORD: ICD-10-PCS | Mod: CPTII,GC,S$GLB, | Performed by: STUDENT IN AN ORGANIZED HEALTH CARE EDUCATION/TRAINING PROGRAM

## 2023-01-26 PROCEDURE — 1101F PT FALLS ASSESS-DOCD LE1/YR: CPT | Mod: CPTII,GC,S$GLB, | Performed by: STUDENT IN AN ORGANIZED HEALTH CARE EDUCATION/TRAINING PROGRAM

## 2023-01-26 PROCEDURE — 3078F DIAST BP <80 MM HG: CPT | Mod: CPTII,GC,S$GLB, | Performed by: STUDENT IN AN ORGANIZED HEALTH CARE EDUCATION/TRAINING PROGRAM

## 2023-01-26 PROCEDURE — 83970 ASSAY OF PARATHORMONE: CPT | Performed by: STUDENT IN AN ORGANIZED HEALTH CARE EDUCATION/TRAINING PROGRAM

## 2023-01-26 PROCEDURE — 80069 RENAL FUNCTION PANEL: CPT | Performed by: STUDENT IN AN ORGANIZED HEALTH CARE EDUCATION/TRAINING PROGRAM

## 2023-01-26 PROCEDURE — 3074F SYST BP LT 130 MM HG: CPT | Mod: CPTII,GC,S$GLB, | Performed by: STUDENT IN AN ORGANIZED HEALTH CARE EDUCATION/TRAINING PROGRAM

## 2023-01-26 PROCEDURE — 99999 PR PBB SHADOW E&M-EST. PATIENT-LVL IV: CPT | Mod: PBBFAC,GC,, | Performed by: STUDENT IN AN ORGANIZED HEALTH CARE EDUCATION/TRAINING PROGRAM

## 2023-01-26 PROCEDURE — 1159F MED LIST DOCD IN RCRD: CPT | Mod: CPTII,GC,S$GLB, | Performed by: STUDENT IN AN ORGANIZED HEALTH CARE EDUCATION/TRAINING PROGRAM

## 2023-01-26 PROCEDURE — 99214 PR OFFICE/OUTPT VISIT, EST, LEVL IV, 30-39 MIN: ICD-10-PCS | Mod: GC,S$GLB,, | Performed by: STUDENT IN AN ORGANIZED HEALTH CARE EDUCATION/TRAINING PROGRAM

## 2023-01-26 PROCEDURE — 3078F PR MOST RECENT DIASTOLIC BLOOD PRESSURE < 80 MM HG: ICD-10-PCS | Mod: CPTII,GC,S$GLB, | Performed by: STUDENT IN AN ORGANIZED HEALTH CARE EDUCATION/TRAINING PROGRAM

## 2023-01-26 PROCEDURE — 99999 PR PBB SHADOW E&M-EST. PATIENT-LVL IV: ICD-10-PCS | Mod: PBBFAC,GC,, | Performed by: STUDENT IN AN ORGANIZED HEALTH CARE EDUCATION/TRAINING PROGRAM

## 2023-01-26 PROCEDURE — 99214 OFFICE O/P EST MOD 30 MIN: CPT | Mod: GC,S$GLB,, | Performed by: STUDENT IN AN ORGANIZED HEALTH CARE EDUCATION/TRAINING PROGRAM

## 2023-01-26 PROCEDURE — 3288F PR FALLS RISK ASSESSMENT DOCUMENTED: ICD-10-PCS | Mod: CPTII,GC,S$GLB, | Performed by: STUDENT IN AN ORGANIZED HEALTH CARE EDUCATION/TRAINING PROGRAM

## 2023-01-26 PROCEDURE — 3074F PR MOST RECENT SYSTOLIC BLOOD PRESSURE < 130 MM HG: ICD-10-PCS | Mod: CPTII,GC,S$GLB, | Performed by: STUDENT IN AN ORGANIZED HEALTH CARE EDUCATION/TRAINING PROGRAM

## 2023-01-26 PROCEDURE — 1101F PR PT FALLS ASSESS DOC 0-1 FALLS W/OUT INJ PAST YR: ICD-10-PCS | Mod: CPTII,GC,S$GLB, | Performed by: STUDENT IN AN ORGANIZED HEALTH CARE EDUCATION/TRAINING PROGRAM

## 2023-01-26 PROCEDURE — 36415 COLL VENOUS BLD VENIPUNCTURE: CPT | Performed by: STUDENT IN AN ORGANIZED HEALTH CARE EDUCATION/TRAINING PROGRAM

## 2023-01-26 PROCEDURE — 3288F FALL RISK ASSESSMENT DOCD: CPT | Mod: CPTII,GC,S$GLB, | Performed by: STUDENT IN AN ORGANIZED HEALTH CARE EDUCATION/TRAINING PROGRAM

## 2023-01-26 PROCEDURE — 82306 VITAMIN D 25 HYDROXY: CPT | Performed by: STUDENT IN AN ORGANIZED HEALTH CARE EDUCATION/TRAINING PROGRAM

## 2023-01-26 PROCEDURE — 1160F PR REVIEW ALL MEDS BY PRESCRIBER/CLIN PHARMACIST DOCUMENTED: ICD-10-PCS | Mod: CPTII,GC,S$GLB, | Performed by: STUDENT IN AN ORGANIZED HEALTH CARE EDUCATION/TRAINING PROGRAM

## 2023-01-26 PROCEDURE — 84443 ASSAY THYROID STIM HORMONE: CPT | Performed by: STUDENT IN AN ORGANIZED HEALTH CARE EDUCATION/TRAINING PROGRAM

## 2023-01-26 PROCEDURE — 1126F AMNT PAIN NOTED NONE PRSNT: CPT | Mod: CPTII,GC,S$GLB, | Performed by: STUDENT IN AN ORGANIZED HEALTH CARE EDUCATION/TRAINING PROGRAM

## 2023-01-26 PROCEDURE — 1160F RVW MEDS BY RX/DR IN RCRD: CPT | Mod: CPTII,GC,S$GLB, | Performed by: STUDENT IN AN ORGANIZED HEALTH CARE EDUCATION/TRAINING PROGRAM

## 2023-01-26 PROCEDURE — 1126F PR PAIN SEVERITY QUANTIFIED, NO PAIN PRESENT: ICD-10-PCS | Mod: CPTII,GC,S$GLB, | Performed by: STUDENT IN AN ORGANIZED HEALTH CARE EDUCATION/TRAINING PROGRAM

## 2023-01-26 RX ORDER — VIT C/E/ZN/COPPR/LUTEIN/ZEAXAN 250MG-90MG
2000 CAPSULE ORAL DAILY
Qty: 60 CAPSULE | Refills: 1 | Status: SHIPPED | OUTPATIENT
Start: 2023-01-26

## 2023-01-26 NOTE — PROGRESS NOTES
Normal corrected calcium of 8.8 but remains with elevated PTH of 228.  Vitamin-D is replete now at 39 (will still have her take the 2000 IU a day). Her TSH remains on the low end of normal at 0.68, I plan to ask her to hold her levothyroxine 75 mcg 1 day a week.     - Otherwise plan remains the same as discussed at visit with thyroid ultrasound and 4D CT next Tuesday and follow-up with Endocrine surgery    Please let me know if you agree with plan

## 2023-01-26 NOTE — PATIENT INSTRUCTIONS
- Labs today to check vitamin D, TSH, renal panel (for calcium), and PTH  - Increase vitamin D dose from 1000 IU (1 pill) up to 2000 IU (2 pills) daily  - Have the ultrasound and CT done next Tuesday and follow up with Dr. Gregorio of Endocrine Surgery  - Remain well-hydrated (ideally about 3 L water intake a day) to help keep calcium down    - Return to clinic in 3 months or after parathyroidectomy

## 2023-01-26 NOTE — TELEPHONE ENCOUNTER
Spoke with patient's daughter who is her primary caretaker on the telephone regarding recent lab results.  Stated that the corrected calcium has normalized while patient on her current dose of Sensipar.  Informed her of the option for peer medical management of hyperparathyroidism and osteoporosis given that she does have normalized calcium on Sensipar.  Asked that if patient decides not to undertake surgery that the currently scheduled scans next week could be canceled and no consultation with Endocrine surgery would have to take place and we can initiate medical management of her osteoporosis.  However, if she wished for surgery to remain an option, the scans should be done with appropriate Endocrine surgery follow-up.    Spoke with patient daughter about low TSH and plan is to hold levothyroxine dose on Sundays and will recheck TSH in 8 weeks.

## 2023-01-30 NOTE — PROGRESS NOTES
I have reviewed and concur with Dr. Amaya's history, physical, assessment, and plan.  I have personally interviewed and examined the patient.        Bettina Chang MD

## 2023-02-27 ENCOUNTER — DOCUMENTATION ONLY (OUTPATIENT)
Dept: SURGERY | Facility: HOSPITAL | Age: 83
End: 2023-02-27
Payer: MEDICARE

## 2023-02-27 NOTE — PROGRESS NOTES
Pre-charting for anticipated visit 02/27/2023, appointment cancelled per patient.      Endocrine Surgery History & Physical     REFERRING PROVIDER: Rob Valdivia DO    REASON FOR VISIT: Hyperparathyroidism    HPI: Elina Jerez is a 83 y.o. female patient with a history notable for sinus bradycardia, aortic valve stenosis, hypothyroidism who presents in consultation for primary hyperparathyroidism.  She was noted on routine laboratory testing to have elevated calcium levels.      Details of the workup are as follows:     Recent laboratory studies:  Hypercalcemia noted since 2019  Max calcium elevation to 15.1 mg/dL (08/27/22)  Parathyroid hormone levels elevated with hypercalcemia and hypophosphatemia  Most recent PTH level was 228.8 with a corresponding calcium of 8.2, corrects to 8.8 for an albumin of 3.3, this is on cinacalcet  Phosphorus: historically low, undetectable and <1.0 in 8/2022  Vitamin D: 39 in Jan 2023  24 hour urine calcium: 3 mg/24 hours however low volume due to CKD and could not be properly run by the lab  Has CKD stage IV.  Creatinine prior to 2019 was mostly 1.7-1.9 then after hypercalcemia developed was elevated to 3.1-4.0    Medications:  Vitamin D supplementation: cholecalciferol 2000IU daily  Lithium, thiazide diuretics: none  Calcium supplements or calcimetrics: none  Cinacalcet 30mg BID, calcium improved, down to 8.2 on 01/26/2023.    Symptoms:   Bone mineral density: reports normal in September 2022    Surgical risk factors:  Risk of concurrent thyroid disease likely low, TSH normal  Ultrasound of the thyroid not done   Cardiovascular risk: echo EF 75%, mild aortic regurgitation, mild to moderate aortic valve stenosis  Antiplatelet therapy and anticoagulation: none listed    Localization studies done prior to this visit:  Ultrasound: not done  Nuclear Medicine Parathyroid Scan: not localizing, no definite abnormal uptake  4D-CT Parathyroid scan: not done    LABORATORY STUDIES:  I  personally and independently reviewed relevant lab test results, including the following:    Lab Results   Component Value Date    CALCIUM 8.2 (L) 01/26/2023    CALCIUM 10.6 (H) 09/27/2022    CALCIUM 11.1 (H) 09/26/2022    CALCIUM 11.8 (H) 09/25/2022    .8 (H) 01/26/2023    .3 (H) 09/25/2022    .1 (H) 08/28/2022    .0 (H) 08/27/2022    ALBUMIN 3.3 (L) 01/26/2023    ALBUMIN 2.7 (L) 09/27/2022    ALBUMIN 3.0 (L) 09/26/2022    ALBUMIN 3.3 (L) 09/25/2022    PHOS 3.6 01/26/2023    PHOS 2.4 (L) 09/27/2022    PHOS 2.7 09/26/2022    PHOS 1.7 (L) 09/25/2022    MG 1.4 (L) 09/27/2022    [id number] 39 01/26/2023    TSH 0.679 01/26/2023       PAST MEDICAL HISTORY:  Patient Active Problem List   Diagnosis    Benign essential HTN    Primary hyperparathyroidism    Sinus bradycardia    Nonrheumatic aortic valve stenosis    Diastolic dysfunction    KHOI (iron deficiency anemia)    Electrolyte abnormality    Hypokalemia    Hyponatremia    Hypomagnesemia    Constipation    Acquired hypothyroidism    Vitamin D deficiency         PAST SURGICAL HISTORY:  Past Surgical History:   Procedure Laterality Date    ESOPHAGOGASTRODUODENOSCOPY  08/13/2018    ESOPHAGOGASTRODUODENOSCOPY N/A 8/13/2018    Procedure: EGD (ESOPHAGOGASTRODUODENOSCOPY);  Surgeon: Han Garcia MD;  Location: [facility];  Service: Endoscopy;  Laterality: N/A;    HYSTERECTOMY          MEDICATIONS:  Current Outpatient Medications   Medication Sig Dispense Refill    acetaminophen-codeine 300-30mg (TYLENOL #3) 300-30 mg Tab Take 1 tablet by mouth every 8 (eight) hours as needed (pain).      aliskiren (TEKTURNA) 300 MG Tab Take by mouth once daily.      allopurinoL (ZYLOPRIM) 300 MG tablet Take 300 mg by mouth once daily.      amLODIPine (NORVASC) 10 MG tablet Take 1 tablet (10 mg total) by mouth once daily. Please HOLD until follow up with PCP 30 tablet 0    atorvastatin (LIPITOR) 20 MG tablet Take 20 mg by mouth once daily.      cholecalciferol,  vitamin D3, (VITAMIN D3) 25 mcg (1,000 unit) capsule Take 2 capsules (2,000 Units total) by mouth once daily. 60 capsule 1    cinacalcet (SENSIPAR) 30 MG Tab Take 1 tablet (30 mg total) by mouth 2 (two) times a day. 60 tablet 11    famotidine (PEPCID) 20 MG tablet Take 1 tablet (20 mg total) by mouth Daily. 30 tablet 11    ferrous sulfate 325 (65 FE) MG EC tablet Take 1 tablet (325 mg total) by mouth once daily. 30 tablet 3    hydroxychloroquine (PLAQUENIL) 200 mg tablet Take by mouth once daily.      levothyroxine (SYNTHROID) 75 MCG tablet Take 75 mcg by mouth once daily.      nebivoloL (BYSTOLIC) 10 MG Tab Take 1 tablet (10 mg total) by mouth once daily. HOLD Until PCP follow up 30 tablet 11    ondansetron (ZOFRAN-ODT) 4 MG TbDL Dissolve 1 tablet (4 mg total) by mouth every 8 (eight) hours as needed. 10 tablet 0     No current facility-administered medications for this visit.     Facility-Administered Medications Ordered in Other Visits   Medication Dose Route Frequency Provider Last Rate Last Admin    lactated ringers infusion   Intravenous Continuous Han Garcia MD 15 mL/hr at 18 0734 New Bag at 18 0734       ALLERGIES:  Review of patient's allergies indicates:   Allergen Reactions    Banana     Pork/porcine containing products Diarrhea    Shellfish containing products Diarrhea       SOCIAL HISTORY:  Social History     Socioeconomic History    Marital status:    Tobacco Use    Smoking status: Former     Types: Cigarettes     Quit date: 1972     Years since quittin.1    Smokeless tobacco: Former   Substance and Sexual Activity    Alcohol use: No    Drug use: No    Sexual activity: Never     Social Determinants of Health     Financial Resource Strain: Low Risk     Difficulty of Paying Living Expenses: Not very hard   Food Insecurity: No Food Insecurity    Worried About Running Out of Food in the Last Year: Never true    Ran Out of Food in the Last Year: Never true   Transportation  Needs: No Transportation Needs    Lack of Transportation (Medical): No    Lack of Transportation (Non-Medical): No   Physical Activity: Insufficiently Active    Days of Exercise per Week: 2 days    Minutes of Exercise per Session: 30 min   Stress: No Stress Concern Present    Feeling of Stress : Only a little   Social Connections: Socially Integrated    Frequency of Communication with Friends and Family: More than three times a week    Frequency of Social Gatherings with Friends and Family: More than three times a week    Attends [employer] Services: More than 4 times per year    Active Member of Clubs or Organizations: Yes    Attends Club or Organization Meetings: 1 to 4 times per year    Marital Status:    Housing Stability: Low Risk     Unable to Pay for Housing in the Last Year: No    Number of Places Lived in the Last Year: 1    Unstable Housing in the Last Year: No         IMAGING STUDIES:  I personally and independently reviewed, visualized and interpreted the images of the below listed radiology studies (including NM scan) and my findings are notable for no definite abnormal uptake.  Reports below for reference.      NM Parathyroid Scan with SPECT and CT 11/01/2022  Impression  No definiteabnormal uptake to suggest parathyroid adenoma.  Nonspecific focal retention of activity at the superior pole of the left thyroid lobe and inferior pole on the right.  Differential considerations include but are not limited to thyroid adenomas and/or intrathyroidal parathyroid adenomas.  Recommend thyroid ultrasound or 4D parathyroid protocol CT for further characterization.    No extrathyroidal foci of uptake or anatomic findings suggestive of parathyroid adenoma.    Electronically signed by: Sameer Aragon  Date:    11/01/2022  Time:    13:34

## 2023-03-02 ENCOUNTER — TELEPHONE (OUTPATIENT)
Dept: CARDIOLOGY | Facility: CLINIC | Age: 83
End: 2023-03-02

## 2023-03-02 ENCOUNTER — OFFICE VISIT (OUTPATIENT)
Dept: CARDIOLOGY | Facility: CLINIC | Age: 83
End: 2023-03-02
Payer: MEDICARE

## 2023-03-02 VITALS
SYSTOLIC BLOOD PRESSURE: 139 MMHG | BODY MASS INDEX: 22.5 KG/M2 | DIASTOLIC BLOOD PRESSURE: 65 MMHG | HEART RATE: 77 BPM | OXYGEN SATURATION: 99 % | WEIGHT: 127 LBS | HEIGHT: 63 IN

## 2023-03-02 DIAGNOSIS — N18.4 CKD (CHRONIC KIDNEY DISEASE), STAGE IV: ICD-10-CM

## 2023-03-02 DIAGNOSIS — E21.0 PRIMARY HYPERPARATHYROIDISM: ICD-10-CM

## 2023-03-02 DIAGNOSIS — E03.9 ACQUIRED HYPOTHYROIDISM: ICD-10-CM

## 2023-03-02 DIAGNOSIS — E78.5 HYPERLIPIDEMIA, UNSPECIFIED HYPERLIPIDEMIA TYPE: ICD-10-CM

## 2023-03-02 DIAGNOSIS — Z01.818 PRE-OP EVALUATION: ICD-10-CM

## 2023-03-02 DIAGNOSIS — R94.31 NONSPECIFIC ABNORMAL ELECTROCARDIOGRAM (ECG) (EKG): ICD-10-CM

## 2023-03-02 DIAGNOSIS — I10 BENIGN ESSENTIAL HTN: ICD-10-CM

## 2023-03-02 DIAGNOSIS — I35.0 NONRHEUMATIC AORTIC VALVE STENOSIS: ICD-10-CM
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
## 2023-03-02 DIAGNOSIS — I51.89 DIASTOLIC DYSFUNCTION: ICD-10-CM

## 2023-03-02 DIAGNOSIS — I25.10 CORONARY ARTERY DISEASE INVOLVING NATIVE CORONARY ARTERY OF NATIVE HEART WITHOUT ANGINA PECTORIS: Primary | ICD-10-CM

## 2023-03-02 DIAGNOSIS — R94.31 ABNORMAL ELECTROCARDIOGRAM (ECG) (EKG): ICD-10-CM
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
## 2023-03-02 PROCEDURE — 3075F PR MOST RECENT SYSTOLIC BLOOD PRESS GE 130-139MM HG: ICD-10-PCS | Mod: CPTII,S$GLB,, | Performed by: INTERNAL MEDICINE

## 2023-03-02 PROCEDURE — 93000 EKG 12-LEAD: ICD-10-PCS | Mod: S$GLB,,, | Performed by: INTERNAL MEDICINE

## 2023-03-02 PROCEDURE — 3288F PR FALLS RISK ASSESSMENT DOCUMENTED: ICD-10-PCS | Mod: CPTII,S$GLB,, | Performed by: INTERNAL MEDICINE

## 2023-03-02 PROCEDURE — 1126F PR PAIN SEVERITY QUANTIFIED, NO PAIN PRESENT: ICD-10-PCS | Mod: CPTII,S$GLB,, | Performed by: INTERNAL MEDICINE

## 2023-03-02 PROCEDURE — 3078F DIAST BP <80 MM HG: CPT | Mod: CPTII,S$GLB,, | Performed by: INTERNAL MEDICINE

## 2023-03-02 PROCEDURE — 1160F RVW MEDS BY RX/DR IN RCRD: CPT | Mod: CPTII,S$GLB,, | Performed by: INTERNAL MEDICINE

## 2023-03-02 PROCEDURE — 3288F FALL RISK ASSESSMENT DOCD: CPT | Mod: CPTII,S$GLB,, | Performed by: INTERNAL MEDICINE

## 2023-03-02 PROCEDURE — 1101F PT FALLS ASSESS-DOCD LE1/YR: CPT | Mod: CPTII,S$GLB,, | Performed by: INTERNAL MEDICINE

## 2023-03-02 PROCEDURE — 99999 PR PBB SHADOW E&M-EST. PATIENT-LVL IV: ICD-10-PCS | Mod: PBBFAC,,, | Performed by: INTERNAL MEDICINE

## 2023-03-02 PROCEDURE — 1159F MED LIST DOCD IN RCRD: CPT | Mod: CPTII,S$GLB,, | Performed by: INTERNAL MEDICINE

## 2023-03-02 PROCEDURE — 1126F AMNT PAIN NOTED NONE PRSNT: CPT | Mod: CPTII,S$GLB,, | Performed by: INTERNAL MEDICINE

## 2023-03-02 PROCEDURE — 3075F SYST BP GE 130 - 139MM HG: CPT | Mod: CPTII,S$GLB,, | Performed by: INTERNAL MEDICINE

## 2023-03-02 PROCEDURE — 93000 ELECTROCARDIOGRAM COMPLETE: CPT | Mod: S$GLB,,, | Performed by: INTERNAL MEDICINE

## 2023-03-02 PROCEDURE — 99214 PR OFFICE/OUTPT VISIT, EST, LEVL IV, 30-39 MIN: ICD-10-PCS | Mod: 25,S$GLB,, | Performed by: INTERNAL MEDICINE

## 2023-03-02 PROCEDURE — 1101F PR PT FALLS ASSESS DOC 0-1 FALLS W/OUT INJ PAST YR: ICD-10-PCS | Mod: CPTII,S$GLB,, | Performed by: INTERNAL MEDICINE

## 2023-03-02 PROCEDURE — 3078F PR MOST RECENT DIASTOLIC BLOOD PRESSURE < 80 MM HG: ICD-10-PCS | Mod: CPTII,S$GLB,, | Performed by: INTERNAL MEDICINE

## 2023-03-02 PROCEDURE — 1159F PR MEDICATION LIST DOCUMENTED IN MEDICAL RECORD: ICD-10-PCS | Mod: CPTII,S$GLB,, | Performed by: INTERNAL MEDICINE

## 2023-03-02 PROCEDURE — 99214 OFFICE O/P EST MOD 30 MIN: CPT | Mod: 25,S$GLB,, | Performed by: INTERNAL MEDICINE

## 2023-03-02 PROCEDURE — 99999 PR PBB SHADOW E&M-EST. PATIENT-LVL IV: CPT | Mod: PBBFAC,,, | Performed by: INTERNAL MEDICINE

## 2023-03-02 PROCEDURE — 1160F PR REVIEW ALL MEDS BY PRESCRIBER/CLIN PHARMACIST DOCUMENTED: ICD-10-PCS | Mod: CPTII,S$GLB,, | Performed by: INTERNAL MEDICINE

## 2023-03-02 RX ORDER — EPOETIN ALFA-EPBX 10000 [IU]/ML
INJECTION, SOLUTION INTRAVENOUS; SUBCUTANEOUS
COMMUNITY
Start: 2023-01-23

## 2023-03-02 NOTE — PROGRESS NOTES
Subjective:      Patient ID: Elina Jerez is a 83 y.o. female.    Chief Complaint: Pre-op Exam (Parathyroid surgery - Ref by Flower Gregorio MD)    HPI:  Pt  referred by Dr Gregorio who is surgeon contemplating parathyroid surgery.  PCP is Dr Tony.    Pt generally feels weak and has low energy.  Pt is frequently off balance and has fallen multiple times.    Pt can walk about a half block with waalker.    Review of Systems   Cardiovascular:  Positive for leg swelling (mild intermittent). Negative for chest pain, claudication, dyspnea on exertion, irregular heartbeat, near-syncope, orthopnea, palpitations and syncope.        Pt takes Plaquenil for rheumatoid arthritis.    Bystolic is on hold and dose of amlodipine has been cut in half    Pt takes allopurinol for gout.    Last stress test was a few years ago with Dr Louis    Pt was hospitalized twice with hypercalcemia at [facility] and main campus.      Past Medical History:   Diagnosis Date    CKD (chronic kidney disease) stage 4, GFR 15-29 ml/min     Coronary artery disease involving native coronary artery of native heart without angina pectoris 3/2/2023    High cholesterol     Hypertension     Sleep apnea     Thyroid disease         Past Surgical History:   Procedure Laterality Date    ESOPHAGOGASTRODUODENOSCOPY  2018    ESOPHAGOGASTRODUODENOSCOPY N/A 2018    Procedure: EGD (ESOPHAGOGASTRODUODENOSCOPY);  Surgeon: Han Garcia MD;  Location: [facility];  Service: Endoscopy;  Laterality: N/A;    HYSTERECTOMY         Family History   Problem Relation Age of Onset    Heart failure Mother     Hypertension Mother     Rheum arthritis Mother     Kidney failure Mother     Kidney failure Father     Hypertension Father     Cerebral palsy Brother        Social History     Socioeconomic History    Marital status:    Tobacco Use    Smoking status: Former     Types: Cigarettes     Quit date: 1972     Years since quittin.1    Smokeless  tobacco: Former   Substance and Sexual Activity    Alcohol use: No    Drug use: No    Sexual activity: Never     Social Determinants of Health     Financial Resource Strain: Low Risk     Difficulty of Paying Living Expenses: Not very hard   Food Insecurity: No Food Insecurity    Worried About Running Out of Food in the Last Year: Never true    Ran Out of Food in the Last Year: Never true   Transportation Needs: No Transportation Needs    Lack of Transportation (Medical): No    Lack of Transportation (Non-Medical): No   Physical Activity: Insufficiently Active    Days of Exercise per Week: 2 days    Minutes of Exercise per Session: 30 min   Stress: No Stress Concern Present    Feeling of Stress : Only a little   Social Connections: Socially Integrated    Frequency of Communication with Friends and Family: More than three times a week    Frequency of Social Gatherings with Friends and Family: More than three times a week    Attends Denominational Services: More than 4 times per year    Active Member of Clubs or Organizations: Yes    Attends Club or Organization Meetings: 1 to 4 times per year    Marital Status:    Housing Stability: Low Risk     Unable to Pay for Housing in the Last Year: No    Number of Places Lived in the Last Year: 1    Unstable Housing in the Last Year: No       Current Outpatient Medications on File Prior to Visit   Medication Sig Dispense Refill    acetaminophen-codeine 300-30mg (TYLENOL #3) 300-30 mg Tab Take 1 tablet by mouth every 8 (eight) hours as needed (pain).      allopurinoL (ZYLOPRIM) 300 MG tablet Take 300 mg by mouth once daily.      amLODIPine (NORVASC) 10 MG tablet Take 1 tablet (10 mg total) by mouth once daily. Please HOLD until follow up with PCP 30 tablet 0    atorvastatin (LIPITOR) 20 MG tablet Take 20 mg by mouth once daily.      cholecalciferol, vitamin D3, (VITAMIN D3) 25 mcg (1,000 unit) capsule Take 2 capsules (2,000 Units total) by mouth once daily. 60 capsule 1     "cinacalcet (SENSIPAR) 30 MG Tab Take 1 tablet (30 mg total) by mouth 2 (two) times a day. 60 tablet 11    famotidine (PEPCID) 20 MG tablet Take 1 tablet (20 mg total) by mouth Daily. 30 tablet 11    ferrous sulfate 325 (65 FE) MG EC tablet Take 1 tablet (325 mg total) by mouth once daily. 30 tablet 3    hydroxychloroquine (PLAQUENIL) 200 mg tablet Take by mouth once daily.      levothyroxine (SYNTHROID) 75 MCG tablet Take 75 mcg by mouth once daily.      ondansetron (ZOFRAN-ODT) 4 MG TbDL Dissolve 1 tablet (4 mg total) by mouth every 8 (eight) hours as needed. 10 tablet 0    RETACRIT 10,000 unit/mL imjection INJECT 1 ML SUBCUTANEOUSLY ONCE A WEEK      nebivoloL (BYSTOLIC) 10 MG Tab Take 1 tablet (10 mg total) by mouth once daily. HOLD Until PCP follow up (Patient not taking: Reported on 3/2/2023) 30 tablet 11    [DISCONTINUED] aliskiren (TEKTURNA) 300 MG Tab Take by mouth once daily.       Current Facility-Administered Medications on File Prior to Visit   Medication Dose Route Frequency Provider Last Rate Last Admin    lactated ringers infusion   Intravenous Continuous Han Garcia MD 15 mL/hr at 08/13/18 0734 New Bag at 08/13/18 0734       Review of patient's allergies indicates:   Allergen Reactions    Banana     Pork derived (porcine)     Pork/porcine containing products Diarrhea    Shellfish containing products Diarrhea and Other (See Comments)     ABDOMINAL PAIN     Objective:     Vitals:    03/02/23 1117 03/02/23 1201   BP: (!) 158/75 139/65   BP Location: Left arm Left arm   Patient Position: Sitting Sitting   BP Method: Medium (Automatic)    Pulse: 77    SpO2: 99%    Weight: 57.6 kg (127 lb)    Height: 5' 3" (1.6 m)         Physical Exam  Constitutional:       Appearance: She is well-developed.   Eyes:      General: No scleral icterus.  Neck:      Vascular: Carotid bruit (bilateral vs. transmitted systolic murmur) present. No JVD.   Cardiovascular:      Rate and Rhythm: Normal rate and regular rhythm.     "  Heart sounds: Murmur (III/VI systolic) heard.     No gallop.   Pulmonary:      Breath sounds: Normal breath sounds.   Musculoskeletal:      Right lower leg: No edema.      Left lower leg: No edema.   Skin:     General: Skin is warm and dry.   Neurological:      Mental Status: She is alert and oriented to person, place, and time.   Psychiatric:         Behavior: Behavior normal.         Thought Content: Thought content normal.         Judgment: Judgment normal.        ECG today reviewed by me: NSR, LAHB, nonspecific T wave abnormality      Lab Visit on 01/26/2023   Component Date Value Ref Range Status    Glucose 01/26/2023 80  70 - 110 mg/dL Final    Sodium 01/26/2023 141  136 - 145 mmol/L Final    Potassium 01/26/2023 3.4 (L)  3.5 - 5.1 mmol/L Final    Chloride 01/26/2023 107  95 - 110 mmol/L Final    CO2 01/26/2023 18 (L)  23 - 29 mmol/L Final    BUN 01/26/2023 33 (H)  8 - 23 mg/dL Final    Calcium 01/26/2023 8.2 (L)  8.7 - 10.5 mg/dL Final    Creatinine 01/26/2023 2.4 (H)  0.5 - 1.4 mg/dL Final    Albumin 01/26/2023 3.3 (L)  3.5 - 5.2 g/dL Final    Phosphorus 01/26/2023 3.6  2.7 - 4.5 mg/dL Final    eGFR 01/26/2023 19.7 (A)  >60 mL/min/1.73 m^2 Final    Anion Gap 01/26/2023 16  8 - 16 mmol/L Final    PTH, Intact 01/26/2023 228.8 (H)  9.0 - 77.0 pg/mL Final    Vit D, 25-Hydroxy 01/26/2023 39  30 - 96 ng/mL Final    TSH 01/26/2023 0.679  0.400 - 4.000 uIU/mL Final    Creatinine 01/26/2023 2.4 (H)  0.5 - 1.4 mg/dL Final    eGFR 01/26/2023 19.7 (A)  >60 mL/min/1.73 m^2 Final   Lab Visit on 10/25/2022   Component Date Value Ref Range Status    Urine Volume 10/24/2022 300  mL Final    Urine Collection Duration 10/24/2022 24  Hr Final    Creatinine, Urine 10/24/2022 167.0  15.0 - 325.0 mg/dL Final    Creatinine, Timed Urine 10/24/2022 20.9 (L)  40.0 - 75.0 mg/Hr Final    Creatinine, Urinr (mg/spec) 10/24/2022 501.0  mg/Spec Final   Admission on 09/24/2022, Discharged on 09/27/2022   Component Date Value Ref Range  Status    WBC 09/24/2022 5.59  3.90 - 12.70 K/uL Final    RBC 09/24/2022 2.84 (L)  4.00 - 5.40 M/uL Final    Hemoglobin 09/24/2022 8.7 (L)  12.0 - 16.0 g/dL Final    Hematocrit 09/24/2022 25.9 (L)  37.0 - 48.5 % Final    MCV 09/24/2022 91  82 - 98 fL Final    MCH 09/24/2022 30.6  27.0 - 31.0 pg Final    MCHC 09/24/2022 33.6  32.0 - 36.0 g/dL Final    RDW 09/24/2022 15.5 (H)  11.5 - 14.5 % Final    Platelets 09/24/2022 176  150 - 450 K/uL Final    MPV 09/24/2022 10.6  9.2 - 12.9 fL Final    Immature Granulocytes 09/24/2022 0.4  0.0 - 0.5 % Final    Gran # (ANC) 09/24/2022 4.2  1.8 - 7.7 K/uL Final    Immature Grans (Abs) 09/24/2022 0.02  0.00 - 0.04 K/uL Final    Lymph # 09/24/2022 0.8 (L)  1.0 - 4.8 K/uL Final    Mono # 09/24/2022 0.5  0.3 - 1.0 K/uL Final    Eos # 09/24/2022 0.1  0.0 - 0.5 K/uL Final    Baso # 09/24/2022 0.05  0.00 - 0.20 K/uL Final    nRBC 09/24/2022 0  0 /100 WBC Final    Gran % 09/24/2022 74.9 (H)  38.0 - 73.0 % Final    Lymph % 09/24/2022 13.4 (L)  18.0 - 48.0 % Final    Mono % 09/24/2022 8.6  4.0 - 15.0 % Final    Eosinophil % 09/24/2022 1.8  0.0 - 8.0 % Final    Basophil % 09/24/2022 0.9  0.0 - 1.9 % Final    Differential Method 09/24/2022 Automated   Final    Sodium 09/24/2022 131 (L)  136 - 145 mmol/L Final    Potassium 09/24/2022 3.2 (L)  3.5 - 5.1 mmol/L Final    Chloride 09/24/2022 100  95 - 110 mmol/L Final    CO2 09/24/2022 16 (L)  23 - 29 mmol/L Final    Glucose 09/24/2022 63 (L)  70 - 110 mg/dL Final    BUN 09/24/2022 41 (H)  8 - 23 mg/dL Final    Creatinine 09/24/2022 4.0 (H)  0.5 - 1.4 mg/dL Final    Calcium 09/24/2022 12.3 (HH)  8.7 - 10.5 mg/dL Final    Total Protein 09/24/2022 6.4  6.0 - 8.4 g/dL Final    Albumin 09/24/2022 3.7  3.5 - 5.2 g/dL Final    Total Bilirubin 09/24/2022 0.7  0.1 - 1.0 mg/dL Final    Alkaline Phosphatase 09/24/2022 52 (L)  55 - 135 U/L Final    AST 09/24/2022 35  10 - 40 U/L Final    ALT 09/24/2022 9 (L)  10 - 44 U/L Final    Anion Gap 09/24/2022 15  8 -  16 mmol/L Final    eGFR 09/24/2022 10.7 (A)  >60 mL/min/1.73 m^2 Final    Lipase 09/24/2022 58  4 - 60 U/L Final    Magnesium 09/24/2022 1.2 (L)  1.6 - 2.6 mg/dL Final    TSH 09/24/2022 0.413  0.400 - 4.000 uIU/mL Final    BNP 09/24/2022 209 (H)  0 - 99 pg/mL Final    Troponin I 09/24/2022 0.727 (H)  0.000 - 0.026 ng/mL Final    Phosphorus 09/24/2022 2.3 (L)  2.7 - 4.5 mg/dL Final    CPK 09/24/2022 168  20 - 180 U/L Final    Troponin I 09/24/2022 0.843 (H)  0.000 - 0.026 ng/mL Final    POCT Glucose 09/24/2022 80  70 - 110 mg/dL Final    POCT Glucose 09/24/2022 74  70 - 110 mg/dL Final    Sodium 09/25/2022 129 (L)  136 - 145 mmol/L Final    Potassium 09/25/2022 3.9  3.5 - 5.1 mmol/L Final    Chloride 09/25/2022 102  95 - 110 mmol/L Final    CO2 09/25/2022 17 (L)  23 - 29 mmol/L Final    Glucose 09/25/2022 72  70 - 110 mg/dL Final    BUN 09/25/2022 36 (H)  8 - 23 mg/dL Final    Creatinine 09/25/2022 3.4 (H)  0.5 - 1.4 mg/dL Final    Calcium 09/25/2022 11.8 (H)  8.7 - 10.5 mg/dL Final    Total Protein 09/25/2022 5.8 (L)  6.0 - 8.4 g/dL Final    Albumin 09/25/2022 3.3 (L)  3.5 - 5.2 g/dL Final    Total Bilirubin 09/25/2022 0.6  0.1 - 1.0 mg/dL Final    Alkaline Phosphatase 09/25/2022 50 (L)  55 - 135 U/L Final    AST 09/25/2022 37  10 - 40 U/L Final    ALT 09/25/2022 8 (L)  10 - 44 U/L Final    Anion Gap 09/25/2022 10  8 - 16 mmol/L Final    eGFR 09/25/2022 13.0 (A)  >60 mL/min/1.73 m^2 Final    Magnesium 09/25/2022 2.3  1.6 - 2.6 mg/dL Final    Phosphorus 09/25/2022 1.7 (L)  2.7 - 4.5 mg/dL Final    WBC 09/25/2022 5.23  3.90 - 12.70 K/uL Final    RBC 09/25/2022 2.70 (L)  4.00 - 5.40 M/uL Final    Hemoglobin 09/25/2022 8.2 (L)  12.0 - 16.0 g/dL Final    Hematocrit 09/25/2022 24.9 (L)  37.0 - 48.5 % Final    MCV 09/25/2022 92  82 - 98 fL Final    MCH 09/25/2022 30.4  27.0 - 31.0 pg Final    MCHC 09/25/2022 32.9  32.0 - 36.0 g/dL Final    RDW 09/25/2022 15.3 (H)  11.5 - 14.5 % Final    Platelets 09/25/2022 158  150 - 450  K/uL Final    MPV 09/25/2022 10.4  9.2 - 12.9 fL Final    Immature Granulocytes 09/25/2022 0.2  0.0 - 0.5 % Final    Gran # (ANC) 09/25/2022 3.3  1.8 - 7.7 K/uL Final    Immature Grans (Abs) 09/25/2022 0.01  0.00 - 0.04 K/uL Final    Lymph # 09/25/2022 1.1  1.0 - 4.8 K/uL Final    Mono # 09/25/2022 0.6  0.3 - 1.0 K/uL Final    Eos # 09/25/2022 0.3  0.0 - 0.5 K/uL Final    Baso # 09/25/2022 0.04  0.00 - 0.20 K/uL Final    nRBC 09/25/2022 0  0 /100 WBC Final    Gran % 09/25/2022 63.2  38.0 - 73.0 % Final    Lymph % 09/25/2022 20.5  18.0 - 48.0 % Final    Mono % 09/25/2022 10.5  4.0 - 15.0 % Final    Eosinophil % 09/25/2022 4.8  0.0 - 8.0 % Final    Basophil % 09/25/2022 0.8  0.0 - 1.9 % Final    Differential Method 09/25/2022 Automated   Final    Troponin I 09/25/2022 0.928 (H)  0.000 - 0.026 ng/mL Final    PTH, Intact 09/25/2022 309.3 (H)  9.0 - 77.0 pg/mL Final    Sodium 09/26/2022 133 (L)  136 - 145 mmol/L Final    Potassium 09/26/2022 3.8  3.5 - 5.1 mmol/L Final    Chloride 09/26/2022 107  95 - 110 mmol/L Final    CO2 09/26/2022 18 (L)  23 - 29 mmol/L Final    Glucose 09/26/2022 77  70 - 110 mg/dL Final    BUN 09/26/2022 26 (H)  8 - 23 mg/dL Final    Creatinine 09/26/2022 2.8 (H)  0.5 - 1.4 mg/dL Final    Calcium 09/26/2022 11.1 (H)  8.7 - 10.5 mg/dL Final    Total Protein 09/26/2022 5.3 (L)  6.0 - 8.4 g/dL Final    Albumin 09/26/2022 3.0 (L)  3.5 - 5.2 g/dL Final    Total Bilirubin 09/26/2022 0.6  0.1 - 1.0 mg/dL Final    Alkaline Phosphatase 09/26/2022 50 (L)  55 - 135 U/L Final    AST 09/26/2022 32  10 - 40 U/L Final    ALT 09/26/2022 8 (L)  10 - 44 U/L Final    Anion Gap 09/26/2022 8  8 - 16 mmol/L Final    eGFR 09/26/2022 16.5 (A)  >60 mL/min/1.73 m^2 Final    Magnesium 09/26/2022 1.9  1.6 - 2.6 mg/dL Final    Phosphorus 09/26/2022 2.7  2.7 - 4.5 mg/dL Final    WBC 09/26/2022 5.84  3.90 - 12.70 K/uL Final    RBC 09/26/2022 2.59 (L)  4.00 - 5.40 M/uL Final    Hemoglobin 09/26/2022 7.9 (L)  12.0 - 16.0 g/dL  Final    Hematocrit 09/26/2022 23.4 (L)  37.0 - 48.5 % Final    MCV 09/26/2022 90  82 - 98 fL Final    MCH 09/26/2022 30.5  27.0 - 31.0 pg Final    MCHC 09/26/2022 33.8  32.0 - 36.0 g/dL Final    RDW 09/26/2022 15.4 (H)  11.5 - 14.5 % Final    Platelets 09/26/2022 182  150 - 450 K/uL Final    MPV 09/26/2022 11.8  9.2 - 12.9 fL Final    Immature Granulocytes 09/26/2022 0.3  0.0 - 0.5 % Final    Gran # (ANC) 09/26/2022 3.4  1.8 - 7.7 K/uL Final    Immature Grans (Abs) 09/26/2022 0.02  0.00 - 0.04 K/uL Final    Lymph # 09/26/2022 1.4  1.0 - 4.8 K/uL Final    Mono # 09/26/2022 0.7  0.3 - 1.0 K/uL Final    Eos # 09/26/2022 0.3  0.0 - 0.5 K/uL Final    Baso # 09/26/2022 0.04  0.00 - 0.20 K/uL Final    nRBC 09/26/2022 0  0 /100 WBC Final    Gran % 09/26/2022 58.2  38.0 - 73.0 % Final    Lymph % 09/26/2022 24.0  18.0 - 48.0 % Final    Mono % 09/26/2022 11.1  4.0 - 15.0 % Final    Eosinophil % 09/26/2022 5.7  0.0 - 8.0 % Final    Basophil % 09/26/2022 0.7  0.0 - 1.9 % Final    Differential Method 09/26/2022 Automated   Final    Sodium 09/27/2022 133 (L)  136 - 145 mmol/L Final    Potassium 09/27/2022 3.6  3.5 - 5.1 mmol/L Final    Chloride 09/27/2022 109  95 - 110 mmol/L Final    CO2 09/27/2022 16 (L)  23 - 29 mmol/L Final    Glucose 09/27/2022 68 (L)  70 - 110 mg/dL Final    BUN 09/27/2022 21  8 - 23 mg/dL Final    Creatinine 09/27/2022 2.3 (H)  0.5 - 1.4 mg/dL Final    Calcium 09/27/2022 10.6 (H)  8.7 - 10.5 mg/dL Final    Total Protein 09/27/2022 4.9 (L)  6.0 - 8.4 g/dL Final    Albumin 09/27/2022 2.7 (L)  3.5 - 5.2 g/dL Final    Total Bilirubin 09/27/2022 0.6  0.1 - 1.0 mg/dL Final    Alkaline Phosphatase 09/27/2022 49 (L)  55 - 135 U/L Final    AST 09/27/2022 27  10 - 40 U/L Final    ALT 09/27/2022 6 (L)  10 - 44 U/L Final    Anion Gap 09/27/2022 8  8 - 16 mmol/L Final    eGFR 09/27/2022 20.8 (A)  >60 mL/min/1.73 m^2 Final    Magnesium 09/27/2022 1.4 (L)  1.6 - 2.6 mg/dL Final    Phosphorus 09/27/2022 2.4 (L)  2.7 - 4.5  mg/dL Final    WBC 09/27/2022 4.34  3.90 - 12.70 K/uL Final    RBC 09/27/2022 2.54 (L)  4.00 - 5.40 M/uL Final    Hemoglobin 09/27/2022 7.7 (L)  12.0 - 16.0 g/dL Final    Hematocrit 09/27/2022 23.2 (L)  37.0 - 48.5 % Final    MCV 09/27/2022 91  82 - 98 fL Final    MCH 09/27/2022 30.3  27.0 - 31.0 pg Final    MCHC 09/27/2022 33.2  32.0 - 36.0 g/dL Final    RDW 09/27/2022 15.5 (H)  11.5 - 14.5 % Final    Platelets 09/27/2022 185  150 - 450 K/uL Final    MPV 09/27/2022 12.3  9.2 - 12.9 fL Final    Immature Granulocytes 09/27/2022 0.5  0.0 - 0.5 % Final    Gran # (ANC) 09/27/2022 2.2  1.8 - 7.7 K/uL Final    Immature Grans (Abs) 09/27/2022 0.02  0.00 - 0.04 K/uL Final    Lymph # 09/27/2022 1.2  1.0 - 4.8 K/uL Final    Mono # 09/27/2022 0.6  0.3 - 1.0 K/uL Final    Eos # 09/27/2022 0.4  0.0 - 0.5 K/uL Final    Baso # 09/27/2022 0.03  0.00 - 0.20 K/uL Final    nRBC 09/27/2022 0  0 /100 WBC Final    Gran % 09/27/2022 50.8  38.0 - 73.0 % Final    Lymph % 09/27/2022 27.0  18.0 - 48.0 % Final    Mono % 09/27/2022 12.9  4.0 - 15.0 % Final    Eosinophil % 09/27/2022 8.1 (H)  0.0 - 8.0 % Final    Basophil % 09/27/2022 0.7  0.0 - 1.9 % Final    Differential Method 09/27/2022 Automated   Final   Admission on 09/13/2022, Discharged on 09/13/2022   Component Date Value Ref Range Status    WBC 09/13/2022 8.73  3.90 - 12.70 K/uL Final    RBC 09/13/2022 3.19 (L)  4.00 - 5.40 M/uL Final    Hemoglobin 09/13/2022 9.9 (L)  12.0 - 16.0 g/dL Final    Hematocrit 09/13/2022 30.1 (L)  37.0 - 48.5 % Final    MCV 09/13/2022 94  82 - 98 fL Final    MCH 09/13/2022 31.0  27.0 - 31.0 pg Final    MCHC 09/13/2022 32.9  32.0 - 36.0 g/dL Final    RDW 09/13/2022 14.9 (H)  11.5 - 14.5 % Final    Platelets 09/13/2022 286  150 - 450 K/uL Final    MPV 09/13/2022 11.2  9.2 - 12.9 fL Final    Immature Granulocytes 09/13/2022 0.3  0.0 - 0.5 % Final    Gran # (ANC) 09/13/2022 6.6  1.8 - 7.7 K/uL Final    Immature Grans (Abs) 09/13/2022 0.03  0.00 - 0.04 K/uL  Final    Lymph # 09/13/2022 1.3  1.0 - 4.8 K/uL Final    Mono # 09/13/2022 0.6  0.3 - 1.0 K/uL Final    Eos # 09/13/2022 0.2  0.0 - 0.5 K/uL Final    Baso # 09/13/2022 0.09  0.00 - 0.20 K/uL Final    nRBC 09/13/2022 0  0 /100 WBC Final    Gran % 09/13/2022 75.3 (H)  38.0 - 73.0 % Final    Lymph % 09/13/2022 14.7 (L)  18.0 - 48.0 % Final    Mono % 09/13/2022 6.8  4.0 - 15.0 % Final    Eosinophil % 09/13/2022 1.9  0.0 - 8.0 % Final    Basophil % 09/13/2022 1.0  0.0 - 1.9 % Final    Differential Method 09/13/2022 Automated   Final    Sodium 09/13/2022 138  136 - 145 mmol/L Final    Potassium 09/13/2022 3.7  3.5 - 5.1 mmol/L Final    Chloride 09/13/2022 103  95 - 110 mmol/L Final    CO2 09/13/2022 19 (L)  23 - 29 mmol/L Final    Glucose 09/13/2022 74  70 - 110 mg/dL Final    BUN 09/13/2022 42 (H)  8 - 23 mg/dL Final    Creatinine 09/13/2022 3.9 (H)  0.5 - 1.4 mg/dL Final    Calcium 09/13/2022 11.6 (H)  8.7 - 10.5 mg/dL Final    Total Protein 09/13/2022 7.2  6.0 - 8.4 g/dL Final    Albumin 09/13/2022 4.1  3.5 - 5.2 g/dL Final    Total Bilirubin 09/13/2022 0.9  0.1 - 1.0 mg/dL Final    Alkaline Phosphatase 09/13/2022 57  55 - 135 U/L Final    AST 09/13/2022 48 (H)  10 - 40 U/L Final    ALT 09/13/2022 21  10 - 44 U/L Final    Anion Gap 09/13/2022 16  8 - 16 mmol/L Final    eGFR 09/13/2022 11.1 (A)  >60 mL/min/1.73 m^2 Final    Lipase 09/13/2022 54  4 - 60 U/L Final    POC Rapid COVID 09/13/2022 Negative  Negative Final     Acceptable 09/13/2022 Yes   Final    Troponin I 09/13/2022 0.540 (H)  0.000 - 0.026 ng/mL Final    CPK 09/13/2022 107  20 - 180 U/L Final   (   Chol HDL LDL CHOLc) TG   03/30/21 1102 154 82 Important  62 52   10/01/19 1007       US Abdomen Complete  Status: Final result     Jobbr Results Release    Jobbr Status: Pending  Results Release     PACS Images for [employer] Viewer     Show images for US Abdomen Complete  US Abdomen Complete  Order: [id number]  Status: Final result     Visible to patient: No (inaccessible in Patient Portal)     Next appt: 03/23/2023 at 10:05 AM in Lab (LAB, [facility])     Dx: Chronic kidney disease, stage III (mo...     0 Result Notes  Details    Reading Physician Reading Date Result Priority   Dung Almanza MD  [phone]  [phone] 5/20/2021 Routine     Narrative & Impression  EXAMINATION:  US ABDOMEN COMPLETE     CLINICAL HISTORY:  Chronic kidney disease, stage 3 unspecified     TECHNIQUE:  Complete abdominal ultrasound (including pancreas, aorta, liver, gallbladder, common bile duct, IVC, kidneys, and spleen) was performed.     COMPARISON:  None     FINDINGS:  Pancreas: The visualized portions of pancreas appear normal.     Aorta: No aneurysm.     Liver: 10.4 cm, normal in size. Heterogeneous echogenicity, nonspecific.  No focal lesions.     Gallbladder: No calculi, wall thickening, or pericholecystic fluid.  Negative sonographic Morillo's sign.     Biliary system: 3 mm common bile duct.  No intrahepatic ductal dilatation.     Inferior vena cava: Normal in appearance.     Right kidney: 10.6 cm. No hydronephrosis.     Left kidney: 10.8 cm. No hydronephrosis.  Subcentimeter cyst.     Spleen: 9.0 cm.  Normal in size with homogeneous echotexture.     Miscellaneous: No ascites.     Impression:     No hydronephrosis.        Electronically signed by: Dung Almanza MD  Date:                                            05/20/2021  Time:                                           13:32   Radiology US Carotid Bilateral  Status: Final result     [facility] Results Release    [employer] Status: Pending  Results Release     PACS Images for [employer] Viewer     Show images for Radiology US Carotid Bilateral  Radiology US Carotid Bilateral  Order: [id number]  Status: Final result    Visible to patient: No (inaccessible in Patient Portal)     Next appt: 03/23/2023 at 10:05 AM in Lab (LAB, [facility])     Dx: Abnormal chest sounds     0 Result Notes  Details    Reading Physician  Reading Date Result Priority   Dung Almanza MD  [phone]  [phone] 5/20/2021 Routine     Narrative & Impression  EXAMINATION:  US CAROTID BILATERAL     CLINICAL HISTORY:  Other specified symptoms and signs involving the circulatory and respiratory systems     TECHNIQUE:  Grayscale and color Doppler ultrasound examination of the carotid and vertebral artery systems bilaterally.  Stenosis estimates are per the NASCET measurement criteria.     COMPARISON:  None.     FINDINGS:  Right:     Internal Carotid Artery (ICA) peak systolic velocity 45 cm/sec     ICA/CCA peak systolic velocity ratio: 0.9     Plaque formation: Homogeneous     Vertebral artery: Antegrade flow and normal waveform.     Left:     Internal Carotid Artery (ICA)  peak systolic velocity 61 cm/sec     ICA/CCA peak systolic velocity ratio: 1.2     Plaque formation: Homogeneous     Vertebral artery: Antegrade flow and normal waveform.     Impression:     Less than 50% stenosis of the bilateral internal carotid arteries.        Electronically signed by: Dung Almanza MD  Date:                                            05/20/2021  Time:                                           13:36   CT Chest Abdomen Pelvis Without Contrast (XPD)  Status: Final result     Alignable Results Release    Alignable Status: Pending  Results Release     PACS Images for [employer] Viewer     Show images for CT Chest Abdomen Pelvis Without Contrast (XPD)  CT Chest Abdomen Pelvis Without Contrast (XPD)  Order: [id number]  Status: Final result    Visible to patient: No (inaccessible in Patient Portal)     Next appt: 03/23/2023 at 10:05 AM in Lab (LAB, [facility])     0 Result Notes  Details    Reading Physician Reading Date Result Priority   Bennie Gonzalez MD  [phone]  [phone] 8/31/2022 Routine     Narrative & Impression  EXAMINATION:  CT CHEST ABDOMEN PELVIS WITHOUT CONTRAST(XPD)     CLINICAL HISTORY:  Weight loss, unintended;     TECHNIQUE:  Noncontrast images  were obtained through the chest, abdomen, and pelvis without intravenous contrast.  500 cc oral contrast was administered.     COMPARISON:  CT abdomen pelvis dated 08/13/2018     FINDINGS:  Chest: Structures at the base of the neck are unremarkable.  No axillary adenopathy.  Single prominent precarinal node measuring 1.2 cm short axis, nonspecific.  Limited assessment of the pulmonary spenser given noncontrast technique.  Anterior pericardial effusion measuring approximately 1.5 cm in maximal thickness.  Scattered aortic arch and coronary calcific atherosclerosis.  The trachea and mainstem bronchi are clear.     There is moderate right and mild left pleural effusion.  Patchy and partial confluent parenchymal ground-glass bilaterally with central predominance.  Streaky lower lung peribronchial thickening with atelectasis and/or scar.  A 3 mm right upper lobe nodule (series 4, image 56) and right lung subsolid nodule measuring 4 mm (series 4, image 114).  Punctate left apical nodule (series 4, image 40).     Abdomen and pelvis:     The liver, spleen, pancreas, and adrenal glands demonstrate unremarkable noncontrast appearance.  Some attenuating debris within the dependent gallbladder suggesting sludge or stones.  Kidneys appear symmetric with scattered renal vascular calcification and nonspecific perinephric stranding.  No hydronephrosis.  Urinary bladder is partially decompressed and unremarkable.  The uterus is absent.  Few intrapelvic phleboliths.     The GI tract is normal in caliber noting incidental transverse duodenal diverticulum.  Appendix is normal.  Trace intrapelvic free fluid.  No adenopathy.  Multifocal aorto bi-iliac atherosclerosis.  Small fat containing right inguinal hernia.  Lower body wall edema.     Similar patchy sclerosis at the femoral heads.  Scattered osseous degenerative change.  No aggressive osseous lesion.     Impression:     Patchy and partial confluent lung parenchymal ground-glass and  bilateral pleural effusions.  Constellation of findings most suggestive of pulmonary edema with infectious component difficult to entirely exclude.  Additional lower lung peribronchial thickening with atelectasis and/or scar.     Few subcentimeter pulmonary nodules, technically indeterminate.     Attenuating debris within the dependent gallbladder suggesting stones or sludge.     Pericardial effusion, trace intrapelvic free fluid, lower body wall edema, multifocal vascular atherosclerosis, and additional findings as above.        Electronically signed by: Bennie Gonzalez  Date:                                            08/31/2022  Time:                                              CT Abdomen Pelvis  Without Contrast  Status: Final result     MyChart Results Release    Knovel Status: Pending  Results Release     PACS Images for [email or web address] Viewer     Show images for CT Abdomen Pelvis Without Contrast  CT Abdomen Pelvis  Without Contrast  Order: [id number]  Status: Final result    Visible to patient: No (inaccessible in Patient Portal)     Next appt: 03/23/2023 at 10:05 AM in Lab (LAB, [facility])     0 Result Notes  Details    Reading Physician Reading Date Result Priority   Cleve Waddell MD  [phone]  [phone] 9/13/2022 STAT     Narrative & Impression  EXAMINATION:  CT ABDOMEN PELVIS WITHOUT CONTRAST     CLINICAL HISTORY:  Nausea/vomiting;     TECHNIQUE:  Low dose axial images, sagittal and coronal reformations were obtained from the lung bases to the pubic symphysis.  Oral contrast was not administered.     COMPARISON:  CT chest 08/31/2022     FINDINGS:  Images of the lower thorax are remarkable for minimal dependent atelectasis.  There is calcification in the distribution of the coronary arteries.  There is a small pericardial effusion.     The liver, spleen and left adrenal gland are grossly unremarkable for noncontrast technique.  There is atrophy of the pancreas without pancreatic ductal dilation.   There is a subcentimeter low attenuating lesion arising from the right adrenal gland, too small for characterization.  There are scattered calcifications either within the pancreatic parenchyma or associated with pancreatic vasculature.  The gallbladder is grossly unremarkable.  There is a small hiatal hernia.  The stomach is decompressed without wall thickening.  There is no biliary dilation or ascites.  No significant abdominal lymphadenopathy.     There is bilateral renal vascular calcification noting possible superimposed left nonobstructive nephrolithiasis.  The bilateral ureters are unable to be followed in their entirety to the urinary bladder, no definite calculi seen or secondary findings to suggest obstructive uropathy.  The urinary bladder is decompressed without wall thickening.  The uterus is absent the adnexa is unremarkable.     There are a few scattered colonic diverticula without inflammation.  The terminal ileum is unremarkable.  There is high attenuating material within the appendix, no secondary findings to suggest acute appendicitis.  The small bowel is grossly unremarkable.  There is atherosclerotic calcification of the aorta and its branches.  Several scattered shotty periaortic and paracaval lymph nodes are noted.  There is a right fat containing inguinal hernia.     There is osteopenia.  Degenerative changes are noted of the bilateral femoroacetabular joints, pubic symphysis, and spine.  No significant inguinal lymphadenopathy.     Impression:     1. No findings to suggest bowel obstruction.  2. Bilateral renal vascular calcification noting possible superimposed left nonobstructive nephrolithiasis.  No hydronephrosis.  3. Small pericardial effusion.  4. Colonic diverticulosis without diverticulitis.  5. Please see above for several additional findings.        Electronically signed by: Cleve Waddell MD  Date:                                            09/13/2022  Time:                     Encounter Form Printed    Encounter form printed on 11/01/22 09:12 AM         NM Parathyroid Scan with SPECT and CT  Status: Final result     [employer] Results Release    [employer] Status: Pending  Results Release       NM Parathyroid Scan with SPECT and CT: Result Notes     Rob Valdivia DO   11/2/2022 1:57 PM CDT       Parathyroid uptake scan did not show adenoma.  Awaiting thyroid ultrasound (sent message to staff to have scheduled as soon as possible).  Has upcoming appointment with Endocrine surgery.            PACS Images for [employer] Viewer     Show images for NM Parathyroid Scan with SPECT and CT  All Reviewers List    Sameer Haynes MD on 11/2/2022 14:08   Sameer Haynes MD on 11/2/2022 14:07   Rob Valdivia DO on 11/2/2022 13:57     NM Parathyroid Scan with SPECT and CT  Order: [id number]  Status: Final result      Visible to patient: No (inaccessible in Patient Portal)       Next appt: 03/23/2023 at 10:05 AM in Lab (LAB, [facility] [place])       Dx: Primary hyperparathyroidism       1 Result Note      Details    Reading Physician Reading Date Result Priority   Sameer Aragon MD  [phone]  [phone] 11/1/2022 Routine   Reynaldo Bernard MD  [phone]  [phone] 11/1/2022      Narrative & Impression  EXAMINATION:  NM PARATHYROID SCAN WITH SPECT AND CT     CLINICAL HISTORY:  Primary hyperparathyroidism     TECHNIQUE:  Following injection of 21.7 mCi Tc99m sestamibi and approximately 10 minute delay, anterior projection images of the neck and chest were obtained. After a two-hour delay, repeat anterior projection images of the neck were acquired. SPECT and CT images of the neck and upper thorax were also acquired.     COMPARISON:  None.     FINDINGS:  There is physiological tracer uptake in the myocardium, salivary glands, and thyroid gland with more prominent uptake at the superior pole on the left and inferior pole on the right. Delayed images redemonstrate focal uptake on  the left and right but otherwise near-complete tracer washout from the thyroid.     By SPECT, there is focal persistent uptake is present at the superior pole on the left and inferior pole on the right with no other areas of focal activity suggestive of parathyroid adenoma. There is incidental physiologic variant uptake in the right atrial appendage.     CT images demonstrate no evidence of abnormal tracer uptake elsewhere.  Cervical soft tissues appear otherwise unremarkable.     Impression:     No definiteabnormal uptake to suggest parathyroid adenoma.  Nonspecific focal retention of activity at the superior pole of the left thyroid lobe and inferior pole on the right.  Differential considerations include but are not limited to thyroid adenomas and/or intrathyroidal parathyroid adenomas.  Recommend thyroid ultrasound or 4D parathyroid protocol CT for further characterization.     No extrathyroidal foci of uptake or anatomic findings suggestive of parathyroid adenoma.     I, Sameer Aragon MD, attest that I reviewed and interpreted the images.     Electronically signed by resident: Reynaldo Bernard  Date:                                            11/01/2022  Time:                                           13:03     Electronically signed by: Sameer Aragon  Date:                                            11/01/2022  Time:                                           13:34               Exam Ended: 11/01/22 12:40 Last Resulted: 11/01/22 13:34            Accession #: [id number]  Transthoracic echo (TTE) complete  Order# [id number]  Reading physician: Cuauhtemoc Ivey MD Ordering physician: Heike Carroll DNP Study date: 8/29/22     Reason for Exam  Priority: Routine  Dx: Sinus bradycardia [R00.1 (ICD-10-CM)]     View Images Vital Vitrea     Show images for Echo  Summary    The left ventricle is normal in size with moderate concentric hypertrophy and hyperdynamic systolic function.  There is mild-to-moderate aortic valve  stenosis.  Aortic valve area is 0.85 cm2; peak velocity is 2.97 m/s; mean gradient is 18 mmHg.  Mild aortic regurgitation.  The estimated ejection fraction is 75%.  Grade I left ventricular diastolic dysfunction.  Normal right ventricular size with normal right ventricular systolic function.  Moderate left atrial enlargement.  Mild tricuspid regurgitation.  Intermediate central venous pressure (8 mmHg).  The estimated PA systolic pressure is 37 mmHg.       Assessment:     1. Coronary artery disease involving native coronary artery of native heart without angina pectoris    2. Primary hyperparathyroidism    3. Benign essential HTN    4. Diastolic dysfunction    5. Nonrheumatic aortic valve stenosis    6. Hyperlipidemia, unspecified hyperlipidemia type    7. CKD (chronic kidney disease), stage IV    8. Fall, subsequent encounter    9. Acquired hypothyroidism    10. Nonspecific abnormal electrocardiogram (ECG) (EKG)    11. Abnormal electrocardiogram (ECG) (EKG)    12. Pre-op evaluation      Plan:   Elina was seen today for pre-op exam.    Diagnoses and all orders for this visit:    Coronary artery disease involving native coronary artery of native heart without angina pectoris  -     Holter monitor - 48 hour; Future  -     NM Myocardial Perfusion Spect Multi Pharmacologic; Future  -     Echo; Future  -     Nuclear Stress Test; Future    Primary hyperparathyroidism  -     Ambulatory referral/consult to Cardiology  -     IN OFFICE EKG 12-LEAD (to Muse)  -     Holter monitor - 48 hour; Future  -     NM Myocardial Perfusion Spect Multi Pharmacologic; Future  -     Echo; Future  -     Nuclear Stress Test; Future    Benign essential HTN  -     IN OFFICE EKG 12-LEAD (to Muse)  -     Holter monitor - 48 hour; Future  -     NM Myocardial Perfusion Spect Multi Pharmacologic; Future  -     Echo; Future  -     Nuclear Stress Test; Future    Diastolic dysfunction  -     IN OFFICE EKG 12-LEAD (to Muse)  -     Holter monitor - 48  hour; Future  -     NM Myocardial Perfusion Spect Multi Pharmacologic; Future  -     Echo; Future  -     Nuclear Stress Test; Future    Nonrheumatic aortic valve stenosis  -     IN OFFICE EKG 12-LEAD (to Muse)  -     Holter monitor - 48 hour; Future  -     NM Myocardial Perfusion Spect Multi Pharmacologic; Future  -     Echo; Future  -     Nuclear Stress Test; Future    Hyperlipidemia, unspecified hyperlipidemia type  -     IN OFFICE EKG 12-LEAD (to Muse)  -     Holter monitor - 48 hour; Future  -     NM Myocardial Perfusion Spect Multi Pharmacologic; Future  -     Echo; Future  -     Nuclear Stress Test; Future    CKD (chronic kidney disease), stage IV  -     IN OFFICE EKG 12-LEAD (to Muse)  -     Holter monitor - 48 hour; Future  -     NM Myocardial Perfusion Spect Multi Pharmacologic; Future  -     Echo; Future  -     Nuclear Stress Test; Future    Fall, subsequent encounter  -     IN OFFICE EKG 12-LEAD (to Muse)  -     Holter monitor - 48 hour; Future  -     NM Myocardial Perfusion Spect Multi Pharmacologic; Future  -     Echo; Future  -     Nuclear Stress Test; Future    Acquired hypothyroidism  -     IN OFFICE EKG 12-LEAD (to Muse)  -     Holter monitor - 48 hour; Future  -     NM Myocardial Perfusion Spect Multi Pharmacologic; Future  -     Echo; Future  -     Nuclear Stress Test; Future    Nonspecific abnormal electrocardiogram (ECG) (EKG)  -     Holter monitor - 48 hour; Future  -     NM Myocardial Perfusion Spect Multi Pharmacologic; Future  -     Echo; Future  -     Nuclear Stress Test; Future    Abnormal electrocardiogram (ECG) (EKG)  -     Holter monitor - 48 hour; Future  -     NM Myocardial Perfusion Spect Multi Pharmacologic; Future  -     Echo; Future  -     Nuclear Stress Test; Future    Pre-op evaluation  -     Holter monitor - 48 hour; Future  -     NM Myocardial Perfusion Spect Multi Pharmacologic; Future  -     Echo; Future  -     Nuclear Stress Test; Future     Pt's aortic stenosis does not  appear to be critical but will repeat the echocardiogram    Pt has chronic CAD but does not have angina pectoris or heart failure but is quite sedentary. Will repeat the Lexiscan Cardiolite stress test.    Pt has had multiple falls in the past which may have been due to orthostatic hypotension but may have just been due to frailty and deconditioning.  Will evaluate pt for possible arrhythmia with a holter monitor    Pt is at increased risk of complications from parathyroid surgery due to moderate aortic stenosis and frailty.    If stress test is OK and echocardiogram shows no significant change then there would be no contraindication to parathyroid surgery.    F/u with Luiz Tony and Jg    Follow up in about 6 months (around 9/2/2023).

## 2023-03-21 ENCOUNTER — TELEPHONE (OUTPATIENT)
Dept: CARDIOLOGY | Facility: CLINIC | Age: 83
End: 2023-03-21
Payer: MEDICARE

## 2023-03-21 NOTE — TELEPHONE ENCOUNTER
I spoke with pt's daughter.  Cardiolite stress test is normal. Heart is strong and there is no sign of blockage.  Echocardiogram shows very mild aortic stenosis.  Pt is medically stable for parathyroid surgery if needed.

## 2023-03-28 ENCOUNTER — TELEPHONE (OUTPATIENT)
Dept: CARDIOLOGY | Facility: CLINIC | Age: 83
End: 2023-03-28
Payer: MEDICARE

## 2023-03-28 NOTE — TELEPHONE ENCOUNTER
I spoke with pt>  Holter shows no fast or slow heart beats which would cause weakness or a fall.  There are frequent PVC's and very frequent PAC's which do not require specific treatment .  (Will continue Bystolic)

## 2024-09-27 PROBLEM — E83.52 HYPERCALCEMIA: Status: ACTIVE | Noted: 2024-09-27

## 2024-10-01 PROBLEM — R53.1 GENERAL WEAKNESS: Status: ACTIVE | Noted: 2024-10-01

## 2024-10-10 ENCOUNTER — TELEPHONE (OUTPATIENT)
Dept: ENDOCRINOLOGY | Facility: CLINIC | Age: 84
End: 2024-10-10
Payer: MEDICARE

## 2024-10-14 NOTE — PROGRESS NOTES
FOLLOW-UP PATIENT VISIT    Subjective:      Chief Complaint: Hypercalcemia and Hyperparathyroidism      HPI: Elina Jerez is a 84 y.o. female who is here for hypothyroidism and hyperparathyroidism  past medical history of CKD (chronic kidney disease) stage 4, GFR 15-29 ml/min, Coronary artery disease involving native coronary artery of native heart without angina pectoris (3/2/2023), High cholesterol, Hypertension, Sleep apnea     Was last seen by Dr. Chang and Dr. Valdivia in the hospital 02/2023    Was hospitalized 09/27/2024 for hypercalcemia. Was treated with IV fluids and sensipar.     Regarding Primary Hyperparathyroidism:    - Diagnosed September 2022, elevated PTH with hypercalcemia and hypophosphatemia  - Elevated calcium levels since at least 2019  - On sensipar 30 mg daily BID since 01/25/2023     - Underwent NM parathyroid scan 10/2022: did not show adenoma  Was supposed to see Dr. Gregorio in 02/2023, but canceled. She is no longer interested in surgery.    - Daily intake of calcium: N/A  - Daily intake of vitamin D: D3 20,000 IU daily  - Taking lithium or hydrochlorothiazide: no     - Most recent DEXA: reports normal in September 2022 (per record from office visit with Dr. Gregorio 2/27/23, not available to review)    - Surgical indications:  No   Yes  [x]    []  Age <50 years  []    [x]  Nephrolithiasis/Nephrocalcinosis  [x]    []  24 hour urine calcium level greater than >250 mg/day in women; >300 mg/day in men  []    [x]  eGFR or creatinine clearance <60 mL/min  [x]    []  Osteoporosis   []    [x]  Serum calcium level >1 mg/dL (0.25 mmol/L) above the upper limit of normal    - Current symptoms:    No   Yes  [x]    []  Fractures  [x]    []  Falls  [x]    []  Loss of height  []    [x]  Lethargy/Confusion  [x]    []  Depression/Anxiety  [x]    []  Mental Fog  [x]    []  Polyuria/Polydipsia  []    [x]  Nausea/vomiting  []    [x]  Constipation  [x]    []  Muscle weakness/Bone Pain  []    [x]  Fatigue  [x]    " []  GERD with frequent tums  [x]    []  H/O Pancreatitis  Arthritis- hands, hips, and knees bilaterally    Regarding Hypothyroidism:    - Duration of hypothyroidism: around 2014  - Etiology: unknown  - Contributing Factors None  - Current relevant medications: levothyroxine T4 (Synthroid) 75 mcg 1 tablet Sunday to Friday (skip Saturday)  - Takes thyroid medications properly. Takes iron at night.    - Most recent thyroid imaging:     - Current symptoms:    No   Yes  [x]    []   Weight gain- weight loss due to nausea  []    [x]   Cold intolerance  []    [x]   Fatigue  []    [x]   Constipation  []    [x]   Hair loss  [x]    []   Brittle nails  [x]    []   Mental fog  [x]    []   Memory impair  [x]    []   Muscle weakness  [x]    []   Neck swelling, pain, pressure  []    [x]   Hoarseness    - Family History:  No   Yes  []    [x]   Thyroid disorder  [x]    []   Thyroid cancer    Lab Results   Component Value Date    TSH 0.610 09/27/2024       ROS: See HPI    Past Medical History:   Diagnosis Date    CKD (chronic kidney disease) stage 4, GFR 15-29 ml/min     Coronary artery disease involving native coronary artery of native heart without angina pectoris 3/2/2023    High cholesterol     Hypertension     Sleep apnea     Thyroid disease        Past Surgical History:   Procedure Laterality Date    ESOPHAGOGASTRODUODENOSCOPY  08/13/2018    ESOPHAGOGASTRODUODENOSCOPY N/A 8/13/2018    Procedure: EGD (ESOPHAGOGASTRODUODENOSCOPY);  Surgeon: Han Garcia MD;  Location[facility];  Service: Endoscopy;  Laterality: N/A;    HYSTERECTOMY         Reviewed past medical, family, social history and updated as appropriate.    Objective:     /78 (BP Location: Right arm, Patient Position: Sitting)   Pulse 72   Ht 5' 2" (1.575 m)   Wt 52.7 kg (116 lb 2.9 oz)   SpO2 96%   BMI 21.25 kg/m²     BP Readings from Last 5 Encounters:   10/24/24 136/78   10/01/24 (!) 159/71   03/02/23 139/65   01/26/23 124/64   10/12/22 (!) 150/80 " "      Body mass index is 21.25 kg/m².    Wt Readings from Last 3 Encounters:   10/24/24 1319 52.7 kg (116 lb 2.9 oz)   09/30/24 0705 52.2 kg (115 lb)   09/27/24 1810 52.4 kg (115 lb 8.3 oz)   09/27/24 1157 54.4 kg (120 lb)   03/21/23 0827 57.6 kg (127 lb)       Physical Exam  Constitutional:       Appearance: Normal appearance. She is normal weight.      Comments: In a wheelchair   Neurological:      Mental Status: She is alert.   Psychiatric:         Mood and Affect: Mood normal.         Behavior: Behavior normal.         Thought Content: Thought content normal.         Judgment: Judgment normal.       As above    Lab Review:   Lab Results   Component Value Date    HGBA1C 4.9 03/30/2021     Lab Results   Component Value Date    CHOL 154 03/30/2021    HDL 82 (H) 03/30/2021    LDLCALC 62 03/30/2021    TRIG 52 03/30/2021    CHOLHDL 53.2 (H) 03/30/2021     Lab Results   Component Value Date     10/01/2024    K 3.3 (L) 10/01/2024     10/01/2024    CO2 19 (L) 10/01/2024    GLU 64 (L) 10/01/2024    BUN 18 10/01/2024    CREATININE 2.3 (H) 10/01/2024    CALCIUM 10.6 (H) 10/01/2024    PROT 5.0 (L) 10/01/2024    ALBUMIN 3.0 (L) 10/01/2024    BILITOT 0.7 10/01/2024    ALKPHOS 63 10/01/2024    AST 28 10/01/2024    ALT 11 10/01/2024    ANIONGAP 8 10/01/2024    ESTGFRAFRICA 26.6 (A) 03/30/2021    EGFRNONAA 23.1 (A) 03/30/2021    TSH 0.610 09/27/2024     No results found for: "[id number]"  Lab Results   Component Value Date    WBC 3.55 (L) 10/01/2024    HGB 10.6 (L) 10/01/2024    HCT 31.0 (L) 10/01/2024    MCV 95 10/01/2024     (L) 10/01/2024         Assessment/Plan:   Primary hyperparathyroidism  Assessment & Plan:  - History of nephrolithiasis  - Currently with CKD and serum calcium >11.5   - Recently hospitalized for hypercalcemia >12  - Although she meets surgical criteria, she is no longer interested in surgery and prefers medical management  - Continue Sensipar 30 mg twice daily  - Check CMP in 1 week  - " Check DXA scan  - Decrease vitamin D to 1000 IU daily  - Check vitamin D in 1 week  - Remain well-hydrated (ideally about 3 L water intake a day) to help keep calcium down  - Referral to nephrology      Orders:  -     DXA Bone Density Axial Skeleton 1 or more sites; Future; Expected date: 10/24/2024  -     Comprehensive Metabolic Panel; Future; Expected date: 10/24/2024    Vitamin D deficiency  Assessment & Plan:  Previously on D3 20,000 IU  Last vitamin elevated >100  Decrease vitamin D to 1000 IU  Check Vitamin D in 1 week    Orders:  -     DXA Bone Density Axial Skeleton 1 or more sites; Future; Expected date: 10/24/2024  -     Vitamin D; Future; Expected date: 10/24/2024    Acquired hypothyroidism  Assessment & Plan:   - Clinically and biochemically euthyroid   - Goal is a normal TSH for age   - Avoid exogenous hyperthyroidism as this can accelerate bone loss and increase risk of CV complications.     Medication Changes: Decrease Levothyroxine 75 mcg 1 tablet Monday through Friday and skip Saturday and Sunday (5 tablets weekly)     - Check TSH in 6-8 weeks     - Advised to take LT4 on an empty stomach with water and to wait 30-45 minutes before eating or taking other medications    - Calcium and iron need to be  from thyroid hormone replacement by 4 or > hours.   - Please note: if you are taking biotin please hold it for 5 days prior to labs as it can interfere with the thyroid testing.   - Reviewed usual times of thyroid hormone changes   - Reviewed that symptoms of hypothyroidism may not correlate with tsh, and a normal TSH is the goal of therapy. Symptoms are not a justification for over treatment.      Orders:  -     TSH; Future; Expected date: 10/24/2024    CKD (chronic kidney disease), stage IV  -     Ambulatory referral/consult to Nephrology; Future; Expected date: 10/31/2024    Nausea  -     ondansetron (ZOFRAN-ODT) 4 MG TbDL; Take 1 tablet (4 mg total) by mouth every 8 (eight) hours as needed  (Nausea or vomiting).  Dispense: 20 tablet; Refill: 0  -     scopolamine (TRANSDERM-SCOP) 1.3-1.5 mg (1 mg over 3 days); Place 1 patch onto the skin Every 3 (three) days.  Dispense: 7 patch; Refill: 0    Hypercalcemia  Assessment & Plan:  See hyperparathyroidism          Follow up in about 6 months (around 4/24/2025).    Case discussed with Dr. Landers  Recommendations were discussed with the patient in detail  The patient verbalized understanding and agrees with the plan outlined as above.     Visit today included increased complexity associated with the care of the problems addressed and managing the longitudinal care of the patient due to the serious and/or complex managed problems.    Mica Crooks PA-C

## 2024-10-24 ENCOUNTER — OFFICE VISIT (OUTPATIENT)
Dept: ENDOCRINOLOGY | Facility: CLINIC | Age: 84
End: 2024-10-24
Payer: MEDICARE

## 2024-10-24 VITALS
SYSTOLIC BLOOD PRESSURE: 136 MMHG | BODY MASS INDEX: 21.38 KG/M2 | OXYGEN SATURATION: 96 % | DIASTOLIC BLOOD PRESSURE: 78 MMHG | HEART RATE: 72 BPM | WEIGHT: 116.19 LBS | HEIGHT: 62 IN

## 2024-10-24 DIAGNOSIS — R94.31 ABNORMAL ELECTROCARDIOGRAM (ECG) (EKG): ICD-10-CM

## 2024-10-24 DIAGNOSIS — E55.9 VITAMIN D DEFICIENCY: ICD-10-CM

## 2024-10-24 DIAGNOSIS — E83.52 HYPERCALCEMIA: ICD-10-CM

## 2024-10-24 DIAGNOSIS — R11.0 NAUSEA: ICD-10-CM

## 2024-10-24 DIAGNOSIS — N18.4 CKD (CHRONIC KIDNEY DISEASE), STAGE IV: ICD-10-CM

## 2024-10-24 DIAGNOSIS — E21.0 PRIMARY HYPERPARATHYROIDISM: Primary | ICD-10-CM

## 2024-10-24 DIAGNOSIS — E03.9 ACQUIRED HYPOTHYROIDISM: ICD-10-CM

## 2024-10-24 PROCEDURE — 99999 PR PBB SHADOW E&M-EST. PATIENT-LVL IV: CPT | Mod: PBBFAC,,,

## 2024-10-24 RX ORDER — ONDANSETRON 4 MG/1
4 TABLET, ORALLY DISINTEGRATING ORAL EVERY 8 HOURS PRN
Qty: 20 TABLET | Refills: 0 | Status: SHIPPED | OUTPATIENT
Start: 2024-10-24

## 2024-10-24 RX ORDER — SCOLOPAMINE TRANSDERMAL SYSTEM 1 MG/1
1 PATCH, EXTENDED RELEASE TRANSDERMAL
Qty: 7 PATCH | Refills: 0 | Status: SHIPPED | OUTPATIENT
Start: 2024-10-24

## 2024-10-24 NOTE — ASSESSMENT & PLAN NOTE
Previously on D3 20,000 IU  Last vitamin elevated >100  Decrease vitamin D to 1000 IU  Check Vitamin D in 1 week

## 2024-10-24 NOTE — PATIENT INSTRUCTIONS
Hypercalcemia  Continue Sensipar 30 mg twice daily  Check CMP and vitamin D in 1 week  Check DXA scan  Avoid dehydration, excessive calcium supplementation and meds (HCTZ) that can worsen hypercalcemia    Estimated Calcium Content of Foods:  Produce  Serving Size Estimated Calcium*    Jose Luis greens, frozen 8 oz 360 mg   Broccoli magalie 8 oz 200 mg   Kale, frozen 8 oz 180 mg   Soy Beans, green, boiled 8 oz 175 mg   Bok Guadalupe, cooked, boiled 8 oz 160 mg   Figs, dried 2 figs 65 mg   Broccoli, fresh, cooked 8 oz 60 mg   Oranges 1 whole 55 mg   Seafood Serving Size Estimated Calcium*    Sardines, canned with bones 3 oz 325 mg   Lewis, canned with bones 3 oz 180 mg   Shrimp, canned 3 oz 125 mg   Dairy Serving Size Estimated Calcium*    Ricotta, part-skim 4 oz 335 mg   Yogurt, plain, low-fat 6 oz 310 mg   Milk, skim, low-fat, whole 8 oz 300 mg   Yogurt with fruit, low-fat 6 oz 260 mg   Mozzarella, part-skim 1 oz 210 mg   Cheddar 1 oz 205 mg   Yogurt, Greek 6 oz 200 mg   American Cheese 1 oz 195 mg   Feta Cheese 4 oz 140 mg   Cottage Cheese, 2% 4 oz 105 mg   Frozen yogurt, vanilla 8 oz 105 mg   Ice Cream, vanilla 8 oz 85 mg   Parmesan 1 tbsp 55 mg   Fortified Food Serving Size Estimated Calcium*   [place] milk, rice milk or soy milk, fortified 8 oz 300 mg   Orange juice and other fruit juices, fortified 8 oz 300 mg   Tofu, prepared with calcium 4 oz 205 mg   Waffle, frozen, fortified 2 pieces 200 mg   Oatmeal, fortified 1 packet 140 mg   English muffin, fortified 1 muffin 100 mg   Cereal, fortified 8 oz 100-1,000 mg   Other Serving Size Estimated Calcium*   Mac & cheese, frozen 1 package 325 mg   Pizza, cheese, frozen 1 serving 115 mg   Pudding, chocolate, prepared with 2% milk 4 oz 160 mg   Beans, baked, canned 4 oz 160 mg   *The calcium content listed for most foods is estimated and can vary due to multiple factors. Check the food label to determine how much calcium is in a particular product.  If you read the nutrition  label for a food source, it lists the % calcium in that food.  For an 8 oz glass of milk, for example, the label states calcium 30%.  This is equivalent to 300 mg of calcium (multiply the listed number by 10).   **Table from the National [employer]opor[employer] Foundation     Hypothyroidism  Decrease Levothyroxine 75 mcg 1 tablet Monday through Friday and skip Saturday and Sunday (5 tablets weekly)  Check TSH in 6-8 weeks  Take on an empty stomach with water and to wait 30-45 minutes before eating or taking other medications   Calcium and iron need to be  from thyroid hormone replacement by 4 or > hours.  Please note: if you are taking biotin please hold it for 5 days prior to labs as it can interfere with the thyroid testing.

## 2024-10-24 NOTE — ASSESSMENT & PLAN NOTE
- History of nephrolithiasis  - Currently with CKD and serum calcium >11.5   - Recently hospitalized for hypercalcemia >12  - Although she meets surgical criteria, she is no longer interested in surgery and prefers medical management  - Continue Sensipar 30 mg twice daily  - Check CMP in 1 week  - Check DXA scan  - Decrease vitamin D to 1000 IU daily  - Check vitamin D in 1 week  - Remain well-hydrated (ideally about 3 L water intake a day) to help keep calcium down  - Referral to nephrology

## 2024-10-24 NOTE — ASSESSMENT & PLAN NOTE
- Clinically and biochemically euthyroid   - Goal is a normal TSH for age   - Avoid exogenous hyperthyroidism as this can accelerate bone loss and increase risk of CV complications.     Medication Changes: Decrease Levothyroxine 75 mcg 1 tablet Monday through Friday and skip Saturday and Sunday (5 tablets weekly)     - Check TSH in 6-8 weeks     - Advised to take LT4 on an empty stomach with water and to wait 30-45 minutes before eating or taking other medications    - Calcium and iron need to be  from thyroid hormone replacement by 4 or > hours.   - Please note: if you are taking biotin please hold it for 5 days prior to labs as it can interfere with the thyroid testing.   - Reviewed usual times of thyroid hormone changes   - Reviewed that symptoms of hypothyroidism may not correlate with tsh, and a normal TSH is the goal of therapy. Symptoms are not a justification for over treatment.

## 2024-10-30 ENCOUNTER — TELEPHONE (OUTPATIENT)
Dept: ENDOCRINOLOGY | Facility: CLINIC | Age: 84
End: 2024-10-30
Payer: MEDICARE

## 2024-11-04 ENCOUNTER — TELEPHONE (OUTPATIENT)
Dept: CARDIOLOGY | Facility: CLINIC | Age: 84
End: 2024-11-04

## 2024-11-04 NOTE — TELEPHONE ENCOUNTER
----- Message from Carmel sent at 11/4/2024  8:35 AM CST -----  Please call patient for and earlier appt.

## 2024-11-04 NOTE — TELEPHONE ENCOUNTER
Spoke with daughter, informed that LOKESH Willy currently does not have a sooner appointment than 12/19/2024.  Offered to schedule at another location with another provider, she declined.  She will keep appointment on 12/19/2024.  Appointment placed on wait list.  She verbalized understanding.

## 2024-11-11 ENCOUNTER — TELEPHONE (OUTPATIENT)
Dept: ENDOCRINOLOGY | Facility: HOSPITAL | Age: 84
End: 2024-11-11
Payer: MEDICARE

## 2024-11-11 NOTE — TELEPHONE ENCOUNTER
Spoke to patient's daughter to discuss labs. She reports patient is still complaining of nausea and vomits once daily. Nausea is resolved with vomiting. She is also coughing which triggers nausea. Zofran and Scopolamine patches have not relieved her symptoms. She is staying well hydrated with water. Discussed lab results:    Vitamin D level was high. Recommended patient stop Vitamin D 20,000 units and decrease only 1000 IU   Calcium level has improved. Continue Sensipar 30 mg twice daily   Kidney function has decreased. This may partly be due to the high calcium levels, but I recommended hydrating with majority water. She needs to reschedule an appointment with PCP and is scheduled to see Nephrology Jan 2025.  DXA scan shows osteopenia. Recommended she consumes calcium through diet only and to decrease Vitamin D to 1000 IU.    At this time, recommend patient stay well hydrated with water. Explained that I do not recommend Reclast at this time due to poor kidney function. Will send a message to nephrology to see if patient can be seen sooner. Advised to go to the ED if patient is experiencing worsening or new symptoms. Patient' daughter stated understanding.

## 2024-11-11 NOTE — TELEPHONE ENCOUNTER
----- Message from Radhames Clarke sent at 11/11/2024  8:52 AM CST -----  Regarding: FW: Test Results  Contact: [phone]    ----- Message -----  From: Sun Flynn  Sent: 11/8/2024  12:10 PM CST  To: Valer[facility] Canales Staff  Subject: Test Results                                     Calling to speak with provider or nurse regarding recent results of labs/testing. She is still having nausea and vomiting. Please call and discuss with patient.

## 2024-11-13 ENCOUNTER — TELEPHONE (OUTPATIENT)
Dept: NEPHROLOGY | Facility: CLINIC | Age: 84
End: 2024-11-13
Payer: MEDICARE

## 2024-11-20 PROBLEM — R11.14 BILIOUS VOMITING WITH NAUSEA: Status: ACTIVE | Noted: 2024-11-20

## 2024-11-21 ENCOUNTER — TELEPHONE (OUTPATIENT)
Dept: ENDOCRINOLOGY | Facility: CLINIC | Age: 84
End: 2024-11-21
Payer: MEDICARE

## 2024-11-21 PROBLEM — D64.9 ANEMIA: Status: RESOLVED | Noted: 2022-08-29 | Resolved: 2024-11-21

## 2024-11-21 NOTE — TELEPHONE ENCOUNTER
----- Message from Charles sent at 11/21/2024  3:53 PM CST -----  Regarding: pt advice  Contact: pt @ [phone]  Type:  Same Day Appointment Request      Name of Caller:  Aruna     When is the first available appointment? April 10/2025    Symptoms:  nauseous and vomiting in relation to Hypercalcemia    Best Call Back Number:[phone]     Additional Information:  Aruna is calling to get pt seen soon at [facility] as she is needing to be scheduled for surgery as soon as possible

## 2024-11-25 ENCOUNTER — OFFICE VISIT (OUTPATIENT)
Dept: SURGERY | Facility: CLINIC | Age: 84
End: 2024-11-25
Payer: MEDICARE

## 2024-11-25 ENCOUNTER — TELEPHONE (OUTPATIENT)
Dept: ENDOCRINOLOGY | Facility: CLINIC | Age: 84
End: 2024-11-25
Payer: MEDICARE

## 2024-11-25 VITALS
SYSTOLIC BLOOD PRESSURE: 196 MMHG | HEART RATE: 88 BPM | WEIGHT: 115.06 LBS | BODY MASS INDEX: 21.17 KG/M2 | DIASTOLIC BLOOD PRESSURE: 88 MMHG | HEIGHT: 62 IN

## 2024-11-25 DIAGNOSIS — E21.0 PRIMARY HYPERPARATHYROIDISM: ICD-10-CM

## 2024-11-25 PROCEDURE — 1125F AMNT PAIN NOTED PAIN PRSNT: CPT | Mod: CPTII,S$GLB,, | Performed by: STUDENT IN AN ORGANIZED HEALTH CARE EDUCATION/TRAINING PROGRAM

## 2024-11-25 PROCEDURE — 99999 PR PBB SHADOW E&M-EST. PATIENT-LVL IV: CPT | Mod: PBBFAC,,, | Performed by: STUDENT IN AN ORGANIZED HEALTH CARE EDUCATION/TRAINING PROGRAM

## 2024-11-25 PROCEDURE — 3288F FALL RISK ASSESSMENT DOCD: CPT | Mod: CPTII,S$GLB,, | Performed by: STUDENT IN AN ORGANIZED HEALTH CARE EDUCATION/TRAINING PROGRAM

## 2024-11-25 PROCEDURE — 3077F SYST BP >= 140 MM HG: CPT | Mod: CPTII,S$GLB,, | Performed by: STUDENT IN AN ORGANIZED HEALTH CARE EDUCATION/TRAINING PROGRAM

## 2024-11-25 PROCEDURE — 99214 OFFICE O/P EST MOD 30 MIN: CPT | Mod: 25,S$GLB,, | Performed by: STUDENT IN AN ORGANIZED HEALTH CARE EDUCATION/TRAINING PROGRAM

## 2024-11-25 PROCEDURE — 3079F DIAST BP 80-89 MM HG: CPT | Mod: CPTII,S$GLB,, | Performed by: STUDENT IN AN ORGANIZED HEALTH CARE EDUCATION/TRAINING PROGRAM

## 2024-11-25 PROCEDURE — 1159F MED LIST DOCD IN RCRD: CPT | Mod: CPTII,S$GLB,, | Performed by: STUDENT IN AN ORGANIZED HEALTH CARE EDUCATION/TRAINING PROGRAM

## 2024-11-25 PROCEDURE — 1100F PTFALLS ASSESS-DOCD GE2>/YR: CPT | Mod: CPTII,S$GLB,, | Performed by: STUDENT IN AN ORGANIZED HEALTH CARE EDUCATION/TRAINING PROGRAM

## 2024-11-25 PROCEDURE — 1111F DSCHRG MED/CURRENT MED MERGE: CPT | Mod: CPTII,S$GLB,, | Performed by: STUDENT IN AN ORGANIZED HEALTH CARE EDUCATION/TRAINING PROGRAM

## 2024-11-25 NOTE — PROGRESS NOTES
Endocrine and General Surgery Consult    SUBJECTIVE:     History of Present Illness:  Patient is a 84 y.o. female (accompanied by her daughter Uzma) presents with hyperparathyroidism. She has known about this diagnosis since 2019. She has had several admissions for hypercalcemia including ICU admissions, most recently discharged 11/21/2024. She has been seen by an endocrinologist and has been referred to an endocrine surgeon, however she did not keep the appointment since her labs and symptoms had improved. She has had vomiting and nausea, no abdominal pain. She has bone and joint pain associated with rheumatoid arthritis. No fractures, no kidney stones No urinary frequency. Per daughter, she had some incoherence and memory disturbances during her recent admission that have since improved.     She has a past medical history of CKD stage IV, HTN, RA, hypothyroidism, anemia with iron deficiency. No antiplatelet or anticoagulant use. She is currently in a wheelchair. She is able to ambulate with a walker but gets tired easily and frequently uses a wheelchair. She states she has never had chest pain or breathing difficulty. She has been on sensipar 30 mg. She started taking it once a day in September 2024 and is now taking it twice a day starting 11/21/2024. She states her nausea and vomiting improved following the sensipar initiation and increased dosage. She was previously on vitamin D supplementation however stopped when her vitamin D levels were found to be very high.   She states that she would like to avoid surgery if possible but following her most recent admission, she is more interested in surgery.     Chief Complaint   Patient presents with    Consult     Thyroid       Review of patient's allergies indicates:   Allergen Reactions    Banana     Pork derived (porcine)     Pork/porcine containing products Diarrhea    Shellfish containing products Diarrhea and Other (See Comments)     ABDOMINAL PAIN        Current Outpatient Medications   Medication Sig Dispense Refill    allopurinoL (ZYLOPRIM) 300 MG tablet Take 300 mg by mouth once daily.      atorvastatin (LIPITOR) 20 MG tablet Take 20 mg by mouth once daily.      cetirizine (ZYRTEC) 5 MG tablet Take 1 tablet (5 mg total) by mouth once daily. 30 tablet 2    chlorthalidone (HYGROTEN) 25 MG Tab Take 25 mg by mouth once daily.      cinacalcet (SENSIPAR) 30 MG Tab Take 1 tablet (30 mg total) by mouth 2 (two) times daily with meals. 60 tablet 11    ferrous sulfate 325 (65 FE) MG EC tablet Take 1 tablet (325 mg total) by mouth once daily. 30 tablet 3    fluticasone propionate (FLONASE) 50 mcg/actuation nasal spray 2 sprays (100 mcg total) by Each Nostril route once daily. 11.1 mL 0    hydroxychloroquine (PLAQUENIL) 200 mg tablet Take by mouth once daily.      levothyroxine (SYNTHROID) 75 MCG tablet Take 75 mcg by mouth once daily.      nebivoloL (BYSTOLIC) 10 MG Tab Take 1 tablet (10 mg total) by mouth once daily. HOLD Until PCP follow up 30 tablet 11    ondansetron (ZOFRAN-ODT) 4 MG TbDL Take 1 tablet (4 mg total) by mouth every 6 (six) hours as needed (Nausea or vomiting). 30 tablet 1    pantoprazole (PROTONIX) 40 MG tablet Take 1 tablet (40 mg total) by mouth once daily. 30 tablet 2    prochlorperazine (COMPAZINE) 10 MG tablet Take 1 tablet (10 mg total) by mouth every 6 (six) hours as needed (nausea - 2nd choice). 30 tablet 1    promethazine (PHENERGAN) 25 MG tablet Take 1 tablet (25 mg total) by mouth every 6 (six) hours as needed for Nausea (nausea - 3rd choice). 30 tablet 0    scopolamine (TRANSDERM-SCOP) 1.3-1.5 mg (1 mg over 3 days) Place 1 patch onto the skin Every 3 (three) days. 14 patch 0    ketoconazole (NIZORAL) 2 % shampoo Apply topically once as needed for Itching (dandruff twice weekly as needed). 120 mL 0     No current facility-administered medications for this visit.     Facility-Administered Medications Ordered in Other Visits   Medication  "Dose Route Frequency Provider Last Rate Last Admin    lactated ringers infusion   Intravenous Continuous Han Garcia MD 15 mL/hr at 18 0734 New Bag at 18 0734       Past Medical History:   Diagnosis Date    CKD (chronic kidney disease) stage 4, GFR 15-29 ml/min     Coronary artery disease involving native coronary artery of native heart without angina pectoris 3/2/2023    High cholesterol     Hypertension     Sleep apnea     Thyroid disease      Past Surgical History:   Procedure Laterality Date    ESOPHAGOGASTRODUODENOSCOPY  2018    ESOPHAGOGASTRODUODENOSCOPY N/A 2018    Procedure: EGD (ESOPHAGOGASTRODUODENOSCOPY);  Surgeon: Han Garcia MD;  Location: [facility];  Service: Endoscopy;  Laterality: N/A;    ESOPHAGOGASTRODUODENOSCOPY  2024    ESOPHAGOGASTRODUODENOSCOPY N/A 2024    Procedure: EGD (ESOPHAGOGASTRODUODENOSCOPY);  Surgeon: Gera Burleson MD;  Location: [facility];  Service: Endoscopy;  Laterality: N/A;    HYSTERECTOMY       Family History   Problem Relation Name Age of Onset    Heart failure Mother      Hypertension Mother      Rheum arthritis Mother      Kidney failure Mother      Kidney failure Father      Hypertension Father      Cerebral palsy Brother       Social History     Tobacco Use    Smoking status: Former     Current packs/day: 0.00     Types: Cigarettes     Quit date: 1972     Years since quittin.9    Smokeless tobacco: Former   Substance Use Topics    Alcohol use: No    Drug use: No        Review of Systems:  Review of Systems      OBJECTIVE:     Vital Signs (Most Recent)  Pulse: 88 (24 1128)  BP: (!) 196/88 (24 1128)  5' 2" (1.575 m)  52.2 kg (115 lb 1.3 oz)     Physical Exam:  Physical Exam    Neck exam normal. No palpable nodules or lymphadenopathy, Thyroid palpated to be small  Voice quality grossly normal    Surgeon performed ultrasound in office:  Three views of thyroid gland obtained and no discrete nodules. Thyroid " diminutive in size. There a left lower hypodense lesion that could represent an enlarged parathyroid gland measuring 75 x 42 mm        Laboratory    PTH  Component      Latest Ref Rn 8/27/2022 8/28/2022 9/25/2022 1/26/2023 9/27/2024 11/19/2024   PTH      9.0 - 77.0 pg/mL 371.0 (H)  338.1 (H)  309.3 (H)  228.8 (H)  237.1 (H)  248.2 (H)    PTH           228.2 (H)        Calcium  Component      Latest Ref Rn 2/3/2020 3/30/2021 8/27/2022 8/28/2022 8/29/2022 8/30/2022 8/31/2022   Calcium      8.7 - 10.5 mg/dL 10.8 (H)  10.3 (H)  15.1 (HH)  12.9 (HH)  12.6 (HH)  11.8 (H)  11.7 (H)    Calcium          13.8 (HH)  13.1 (HH)      Calcium           13.2 (HH)        Component      Latest Ref [facility] 9/1/2022 9/2/2022 9/13/2022 9/24/2022 9/25/2022 9/26/2022 9/27/2022   Calcium      8.7 - 10.5 mg/dL 11.3 (H)  11.4 (H)  11.6 (H)  12.3 (HH)  11.8 (H)  11.1 (H)  10.6 (H)      Component      Latest Ref [facility] 1/26/2023 9/27/2024 9/28/2024 9/29/2024 9/30/2024 10/1/2024 10/29/2024   Calcium      8.7 - 10.5 mg/dL 8.2 (L)  12.6 (HH)  12.0 (H)  11.4 (H)  11.1 (H)  10.6 (H)  10.3      Component      Latest Ref [facility] 11/19/2024 11/20/2024 11/21/2024   Calcium      8.7 - 10.5 mg/dL 11.0 (H)  11.3 (H)  10.7 (H)       Vitamin D  Component      Latest Ref Rn 8/29/2022 1/26/2023 9/27/2024 10/29/2024   Vitamin D      30 - 96 ng/mL 7 (L)  39  114 (H)  128 (H)      Creatinine  Component      Latest Ref Rng 2/3/2020 3/30/2021 8/27/2022 8/28/2022 8/29/2022 8/30/2022 8/31/2022   Creatinine      0.5 - 1.4 mg/dL 2.1 (H)  2.0 (H)  3.5 (H)  3.5 (H)  3.9 (H)  3.9 (H)  3.9 (H)    Creatinine          3.1 (H)  3.5 (H)      Creatinine           3.6 (H)        Component      Latest Ref Rng 9/1/2022 9/2/2022 9/13/2022 9/24/2022 9/25/2022 9/26/2022 9/27/2022   Creatinine      0.5 - 1.4 mg/dL 3.7 (H)  3.6 (H)  3.9 (H)  4.0 (H)  3.4 (H)  2.8 (H)  2.3 (H)      Component      Latest Ref Rng 1/26/2023 9/27/2024 9/28/2024 9/29/2024 9/30/2024 10/1/2024   Creatinine      0.5 -  1.4 mg/dL 2.4 (H)  2.7 (H)  2.6 (H)  2.7 (H)  2.6 (H)  2.3 (H)    Creatinine       2.4 (H)           Component      Latest Ref Rn 10/29/2024 11/19/2024 11/20/2024 11/21/2024   Creatinine      0.5 - 1.4 mg/dL 4.2 (H)  3.5 (H)  3.6 (H)  3.4 (H)         Phosphorus  Component      Latest Ref Rn 8/28/2022 8/29/2022 8/30/2022 8/31/2022 9/1/2022 9/2/2022   Phosphorus Level      2.7 - 4.5 mg/dL 1.9 (L)  4.7 (H)  4.1  2.8  2.0 (L)  2.7    Phosphorus Level       1.8 (L)  <1.0 (LL)        Phosphorus Level        1.0 (LL)          Component      Latest Ref Rn 9/24/2022 9/25/2022 9/26/2022 9/27/2022 1/26/2023 9/27/2024   Phosphorus Level      2.7 - 4.5 mg/dL 2.3 (L)  1.7 (L)  2.7  2.4 (L)  3.6  2.5 (L)      Component      Latest Ref Rn 9/28/2024 9/29/2024 9/30/2024 10/1/2024   Phosphorus Level      2.7 - 4.5 mg/dL 2.6 (L)  3.1  3.7  4.4         Diagnostic Results:  EXAMINATION:  DXA BONE DENSITY AXIAL SKELETON 1 OR MORE SITES     CLINICAL HISTORY:  Add distal Radius due to hyperparathyroidism; Primary hyperparathyroidism     TECHNIQUE:  DXA scanning was performed over the bilateral hip and lumbar spine.  Review of the images confirms satisfactory positioning and technique.     COMPARISON:  06/14/2016     FINDINGS:  The L1-L4 vertebral bone mineral density is equal to 1.329 g/cm squared with a T score of  1.6.  Prior T-score of 2.8 (L2).  Mild levocurvature of the lumbar spine     The left femoral neck bone mineral density is equal to 0.648 g/cm squared with a T score of -2.1.  Prior T-score of -0.2.     The right femoral neck bone mineral density is equal to 0.698 g/cm squared with a T score of -1.8.  Prior T-score of -0.4.     The left forearm (radius 33%) bone mineral density is equal to  0.675     g/cm squared with at T-Score of -0.3.     There is a 6.1 % risk of a major osteoporotic fracture and a 1.8 % risk of hip fracture in the next 10 years (FRAX).     Impression:     Osteopenia     Consider FDA approved medical  therapies in postmenopausal women and men aged 50 years and older, based on the following:      EXAMINATION:  NM PARATHYROID SCAN WITH SPECT AND CT     CLINICAL HISTORY:  Primary hyperparathyroidism     TECHNIQUE:  Following injection of 21.7 mCi Tc99m sestamibi and approximately 10 minute delay, anterior projection images of the neck and chest were obtained. After a two-hour delay, repeat anterior projection images of the neck were acquired. SPECT and CT images of the neck and upper thorax were also acquired.     COMPARISON:  None.     FINDINGS:  There is physiological tracer uptake in the myocardium, salivary glands, and thyroid gland with more prominent uptake at the superior pole on the left and inferior pole on the right. Delayed images redemonstrate focal uptake on the left and right but otherwise near-complete tracer washout from the thyroid.     By SPECT, there is focal persistent uptake is present at the superior pole on the left and inferior pole on the right with no other areas of focal activity suggestive of parathyroid adenoma. There is incidental physiologic variant uptake in the right atrial appendage.     CT images demonstrate no evidence of abnormal tracer uptake elsewhere.  Cervical soft tissues appear otherwise unremarkable.     Impression:     No definiteabnormal uptake to suggest parathyroid adenoma.  Nonspecific focal retention of activity at the superior pole of the left thyroid lobe and inferior pole on the right.  Differential considerations include but are not limited to thyroid adenomas and/or intrathyroidal parathyroid adenomas.  Recommend thyroid ultrasound or 4D parathyroid protocol CT for further characterization.     No extrathyroidal foci of uptake or anatomic findings suggestive of parathyroid adenoma.         ASSESSMENT/PLAN:     84 year old woman with CKD and severe hypertension presents with long-standing hyperparathyroidism requiring several hospital admissions including ICU  admissions for hypercalcemia.    PLAN:  -preoperative medical, cardiac optimization and risk stratification (patient has appointment scheduled with cardiologist 12/19/2024)  -of particular concern is the patient's elevated blood pressure  -repeat labs at that time including calcium, vitamin D, PTH, Cr, and phosphorus following increased sensipar dosage to 30 mg bid from daily several days ago  -I counseled the patient  as well as her daughter that I recommend parathyroidectomy given severity of symptoms and frequency of hospital admissions. She states she would like to avoid surgery if possible. Will repeat labs following dose change while awaiting medical and cardiac optimization however I explained that surgery will ultimately be the best option for her.    We discussed in detail what the surgery entails. Given the lack of localization on the sestamibi scan and her CKD, she has a higher likelihood of having 4 gland disease. She would be planned to undergo 4 gland exploration. If a single gland was found to have an adenoma, that gland will be resected and intraoperative PTH monitoring would be used to confirm cure. If all 2 or more glands were found to be enlarged, she will likely need to undergo a 3.5 gland parathyroidectomy with forearm reimplantation of half of the parathyroid gland.   We discussed the risks of bleeding that may necessitate reoperation, infection and damage to surrounding tissues, particularly recurrent laryngeal nerve injury and its sequelae. We discussed the risks of non cure, recurrence and hypoparathyroidism which may be permanent.     The patient and her daughter verbalize understanding of the plan and surgery.     -return to clinic in January 2025 to discuss repeat labs and plan for surgery    Juan Acosta MD   Endocrine and General Surgery  [facility]

## 2024-11-25 NOTE — TELEPHONE ENCOUNTER
Attempted to F/U w/ pt in regards to the nauseous and Vomiting she was having and to see if there's anything else I can help Ms. Jerez with left a detail message on pt. VM.

## 2024-11-27 ENCOUNTER — EXTERNAL HOME HEALTH (OUTPATIENT)
Dept: HOME HEALTH SERVICES | Facility: HOSPITAL | Age: 84
End: 2024-11-27
Payer: MEDICARE

## 2024-12-02 ENCOUNTER — TELEPHONE (OUTPATIENT)
Dept: GASTROENTEROLOGY | Facility: CLINIC | Age: 84
End: 2024-12-02
Payer: MEDICARE

## 2024-12-02 NOTE — TELEPHONE ENCOUNTER
[facility] for the patient to call back.      ----- Message from Gera Burleson MD sent at 12/2/2024  3:01 PM CST -----  Please inform , by phone.  The stomach polyp is benign.  No further action needed

## 2024-12-09 ENCOUNTER — DOCUMENT SCAN (OUTPATIENT)
Dept: HOME HEALTH SERVICES | Facility: HOSPITAL | Age: 84
End: 2024-12-09
Payer: MEDICARE

## 2025-01-17 DIAGNOSIS — N18.4 CKD (CHRONIC KIDNEY DISEASE), STAGE IV: Primary | ICD-10-CM

## 2025-01-31 ENCOUNTER — TELEPHONE (OUTPATIENT)
Dept: NEPHROLOGY | Facility: CLINIC | Age: 85
End: 2025-01-31
Payer: MEDICARE